# Patient Record
Sex: FEMALE | Race: WHITE | NOT HISPANIC OR LATINO | Employment: UNEMPLOYED | URBAN - METROPOLITAN AREA
[De-identification: names, ages, dates, MRNs, and addresses within clinical notes are randomized per-mention and may not be internally consistent; named-entity substitution may affect disease eponyms.]

---

## 2021-05-29 ENCOUNTER — RECORDS - HEALTHEAST (OUTPATIENT)
Dept: ADMINISTRATIVE | Facility: CLINIC | Age: 51
End: 2021-05-29

## 2024-01-15 RX ORDER — FOLIC ACID 1 MG/1
1 TABLET ORAL DAILY
COMMUNITY
End: 2024-03-04

## 2024-01-15 RX ORDER — ARIPIPRAZOLE 5 MG/1
5 TABLET ORAL DAILY
COMMUNITY

## 2024-01-15 RX ORDER — TRAZODONE HYDROCHLORIDE 100 MG/1
1-3 TABLET ORAL AT BEDTIME
COMMUNITY

## 2024-01-15 RX ORDER — ASPIRIN 81 MG/1
81 TABLET, CHEWABLE ORAL DAILY
COMMUNITY

## 2024-01-15 RX ORDER — LANOLIN ALCOHOL/MO/W.PET/CERES
100 CREAM (GRAM) TOPICAL DAILY
COMMUNITY
End: 2024-03-04

## 2024-01-15 RX ORDER — NALTREXONE HYDROCHLORIDE 50 MG/1
2 TABLET, FILM COATED ORAL DAILY
COMMUNITY

## 2024-01-15 RX ORDER — AMLODIPINE BESYLATE 5 MG/1
5 TABLET ORAL DAILY
COMMUNITY

## 2024-01-15 RX ORDER — ALBUTEROL SULFATE 90 UG/1
2 AEROSOL, METERED RESPIRATORY (INHALATION) EVERY 6 HOURS PRN
COMMUNITY

## 2024-01-15 RX ORDER — HYDROXYZINE PAMOATE 25 MG/1
2 CAPSULE ORAL 3 TIMES DAILY PRN
COMMUNITY

## 2024-01-15 RX ORDER — NICOTINE 21 MG/24HR
1 PATCH, TRANSDERMAL 24 HOURS TRANSDERMAL EVERY 24 HOURS
COMMUNITY
End: 2024-01-16

## 2024-01-15 RX ORDER — DOXEPIN HYDROCHLORIDE 25 MG/1
25 CAPSULE ORAL AT BEDTIME
COMMUNITY

## 2024-01-15 RX ORDER — CITALOPRAM HYDROBROMIDE 40 MG/1
40 TABLET ORAL DAILY
COMMUNITY

## 2024-01-15 RX ORDER — CHOLECALCIFEROL (VITAMIN D3) 50 MCG
1 TABLET ORAL DAILY
COMMUNITY

## 2024-01-15 RX ORDER — GABAPENTIN 300 MG/1
300 CAPSULE ORAL 3 TIMES DAILY
COMMUNITY

## 2024-01-16 ENCOUNTER — TRANSFERRED RECORDS (OUTPATIENT)
Dept: HEALTH INFORMATION MANAGEMENT | Facility: CLINIC | Age: 54
End: 2024-01-16

## 2024-01-16 ENCOUNTER — OFFICE VISIT (OUTPATIENT)
Dept: FAMILY MEDICINE | Facility: CLINIC | Age: 54
End: 2024-01-16
Payer: COMMERCIAL

## 2024-01-16 VITALS
HEIGHT: 63 IN | HEART RATE: 81 BPM | RESPIRATION RATE: 18 BRPM | OXYGEN SATURATION: 98 % | DIASTOLIC BLOOD PRESSURE: 83 MMHG | BODY MASS INDEX: 28.54 KG/M2 | SYSTOLIC BLOOD PRESSURE: 153 MMHG | TEMPERATURE: 98.7 F | WEIGHT: 161.1 LBS

## 2024-01-16 DIAGNOSIS — I10 BENIGN ESSENTIAL HYPERTENSION: ICD-10-CM

## 2024-01-16 DIAGNOSIS — Z86.2 HISTORY OF ANEMIA: ICD-10-CM

## 2024-01-16 DIAGNOSIS — D50.9 IRON DEFICIENCY ANEMIA, UNSPECIFIED IRON DEFICIENCY ANEMIA TYPE: ICD-10-CM

## 2024-01-16 DIAGNOSIS — R53.83 OTHER FATIGUE: Primary | ICD-10-CM

## 2024-01-16 DIAGNOSIS — R63.5 WEIGHT GAIN: ICD-10-CM

## 2024-01-16 DIAGNOSIS — Z87.898 HISTORY OF ALCOHOL USE: ICD-10-CM

## 2024-01-16 DIAGNOSIS — R07.9 CHEST PAIN, UNSPECIFIED TYPE: ICD-10-CM

## 2024-01-16 DIAGNOSIS — R60.0 PERIPHERAL EDEMA: ICD-10-CM

## 2024-01-16 LAB
BASOPHILS # BLD AUTO: 0.1 10E3/UL (ref 0–0.2)
BASOPHILS NFR BLD AUTO: 1 %
EOSINOPHIL # BLD AUTO: 0.2 10E3/UL (ref 0–0.7)
EOSINOPHIL NFR BLD AUTO: 1 %
ERYTHROCYTE [DISTWIDTH] IN BLOOD BY AUTOMATED COUNT: 18.1 % (ref 10–15)
HBA1C MFR BLD: 5.1 %
HCT VFR BLD AUTO: 27.2 % (ref 35–47)
HGB BLD-MCNC: 7.8 G/DL (ref 11.7–15.7)
IMM GRANULOCYTES # BLD: 0.1 10E3/UL
IMM GRANULOCYTES NFR BLD: 1 %
LYMPHOCYTES # BLD AUTO: 1.4 10E3/UL (ref 0.8–5.3)
LYMPHOCYTES NFR BLD AUTO: 11 %
MCH RBC QN AUTO: 20.1 PG (ref 26.5–33)
MCHC RBC AUTO-ENTMCNC: 28.7 G/DL (ref 31.5–36.5)
MCV RBC AUTO: 70 FL (ref 78–100)
MONOCYTES # BLD AUTO: 1.5 10E3/UL (ref 0–1.3)
MONOCYTES NFR BLD AUTO: 12 %
NEUTROPHILS # BLD AUTO: 9.7 10E3/UL (ref 1.6–8.3)
NEUTROPHILS NFR BLD AUTO: 74 %
NRBC # BLD AUTO: 0 10E3/UL
NRBC BLD AUTO-RTO: 0 /100
PLATELET # BLD AUTO: 642 10E3/UL (ref 150–450)
RBC # BLD AUTO: 3.88 10E6/UL (ref 3.8–5.2)
RETICS # AUTO: 0.05 10E6/UL (ref 0.03–0.1)
RETICS/RBC NFR AUTO: 1.2 % (ref 0.5–2)
WBC # BLD AUTO: 13 10E3/UL (ref 4–11)

## 2024-01-16 PROCEDURE — 84443 ASSAY THYROID STIM HORMONE: CPT | Mod: ORL | Performed by: NURSE PRACTITIONER

## 2024-01-16 PROCEDURE — 85045 AUTOMATED RETICULOCYTE COUNT: CPT | Mod: ORL | Performed by: NURSE PRACTITIONER

## 2024-01-16 PROCEDURE — 84466 ASSAY OF TRANSFERRIN: CPT | Mod: ORL | Performed by: NURSE PRACTITIONER

## 2024-01-16 PROCEDURE — 80053 COMPREHEN METABOLIC PANEL: CPT | Mod: ORL | Performed by: NURSE PRACTITIONER

## 2024-01-16 PROCEDURE — 83036 HEMOGLOBIN GLYCOSYLATED A1C: CPT | Mod: ORL | Performed by: NURSE PRACTITIONER

## 2024-01-16 PROCEDURE — 83540 ASSAY OF IRON: CPT | Mod: ORL | Performed by: NURSE PRACTITIONER

## 2024-01-16 PROCEDURE — 82607 VITAMIN B-12: CPT | Mod: ORL | Performed by: NURSE PRACTITIONER

## 2024-01-16 PROCEDURE — 83550 IRON BINDING TEST: CPT | Mod: ORL | Performed by: NURSE PRACTITIONER

## 2024-01-16 PROCEDURE — 85025 COMPLETE CBC W/AUTO DIFF WBC: CPT | Mod: ORL | Performed by: NURSE PRACTITIONER

## 2024-01-16 PROCEDURE — 80061 LIPID PANEL: CPT | Mod: ORL | Performed by: NURSE PRACTITIONER

## 2024-01-16 PROCEDURE — 85060 BLOOD SMEAR INTERPRETATION: CPT | Mod: ORL | Performed by: STUDENT IN AN ORGANIZED HEALTH CARE EDUCATION/TRAINING PROGRAM

## 2024-01-16 PROCEDURE — 82728 ASSAY OF FERRITIN: CPT | Mod: ORL | Performed by: NURSE PRACTITIONER

## 2024-01-16 RX ORDER — HYDROCHLOROTHIAZIDE 12.5 MG/1
12.5 TABLET ORAL DAILY
Qty: 30 TABLET | Refills: 1 | Status: SHIPPED | OUTPATIENT
Start: 2024-01-16 | End: 2024-01-23 | Stop reason: DRUGHIGH

## 2024-01-16 ASSESSMENT — ANXIETY QUESTIONNAIRES
GAD7 TOTAL SCORE: 14
6. BECOMING EASILY ANNOYED OR IRRITABLE: MORE THAN HALF THE DAYS
5. BEING SO RESTLESS THAT IT IS HARD TO SIT STILL: MORE THAN HALF THE DAYS
1. FEELING NERVOUS, ANXIOUS, OR ON EDGE: MORE THAN HALF THE DAYS
GAD7 TOTAL SCORE: 14
IF YOU CHECKED OFF ANY PROBLEMS ON THIS QUESTIONNAIRE, HOW DIFFICULT HAVE THESE PROBLEMS MADE IT FOR YOU TO DO YOUR WORK, TAKE CARE OF THINGS AT HOME, OR GET ALONG WITH OTHER PEOPLE: VERY DIFFICULT
3. WORRYING TOO MUCH ABOUT DIFFERENT THINGS: MORE THAN HALF THE DAYS
7. FEELING AFRAID AS IF SOMETHING AWFUL MIGHT HAPPEN: MORE THAN HALF THE DAYS
2. NOT BEING ABLE TO STOP OR CONTROL WORRYING: MORE THAN HALF THE DAYS

## 2024-01-16 ASSESSMENT — PAIN SCALES - GENERAL: PAINLEVEL: NO PAIN (0)

## 2024-01-16 ASSESSMENT — PATIENT HEALTH QUESTIONNAIRE - PHQ9
5. POOR APPETITE OR OVEREATING: MORE THAN HALF THE DAYS
SUM OF ALL RESPONSES TO PHQ QUESTIONS 1-9: 18

## 2024-01-16 NOTE — NURSING NOTE
"ROOM:3  BERT BLANCHARD    Preferred Name: Kylie     How did you hear about us?  Other - [facility]    54 year old  Chief Complaint   Patient presents with     Anemia     Patient reports that they have low iron. Patient also reports feeling tired (fatigue)        Blood pressure (!) 150/82, pulse 81, temperature 98.7  F (37.1  C), temperature source Oral, resp. rate 18, height 1.61 m (5' 3.4\"), weight 73.1 kg (161 lb 1.6 oz), SpO2 98%. Body mass index is 28.18 kg/m .  BP completed using cuff size:        There is no problem list on file for this patient.      Wt Readings from Last 2 Encounters:   01/16/24 73.1 kg (161 lb 1.6 oz)     BP Readings from Last 3 Encounters:   01/16/24 (!) 150/82       Allergies   Allergen Reactions     Animal Dander Rash     \"Pets\"       Current Outpatient Medications   Medication     albuterol (PROAIR HFA/PROVENTIL HFA/VENTOLIN HFA) 108 (90 Base) MCG/ACT inhaler     amLODIPine (NORVASC) 5 MG tablet     ARIPiprazole (ABILIFY) 5 MG tablet     aspirin (ASA) 81 MG chewable tablet     citalopram (CELEXA) 40 MG tablet     doxepin (SINEQUAN) 25 MG capsule     folic acid (FOLVITE) 1 MG tablet     gabapentin (NEURONTIN) 300 MG capsule     hydrOXYzine eber (VISTARIL) 25 MG capsule     Multiple Vitamins-Minerals (MULTIVITAMIN ADULTS) TABS     naltrexone (DEPADE/REVIA) 50 MG tablet     nicotine (NICODERM CQ) 21 MG/24HR 24 hr patch     nicotine (NICORETTE) 4 MG lozenge     omeprazole (PRILOSEC) 20 MG DR capsule     thiamine (B-1) 100 MG tablet     traZODone (DESYREL) 100 MG tablet     vitamin D3 (CHOLECALCIFEROL) 50 mcg (2000 units) tablet     No current facility-administered medications for this visit.       Social History     Tobacco Use     Smoking status: Former     Types: Cigarettes     Smokeless tobacco: Former   Vaping Use     Vaping Use: Never used   Substance Use Topics     Alcohol use: Not Currently     Drug use: Never       Honoring Choices - Health Care Directive Guide offered to patient " "at time of visit.    Health Maintenance Due   Topic Date Due     ADVANCE CARE PLANNING  Never done     COLORECTAL CANCER SCREENING  Never done     HIV SCREENING  Never done     HEPATITIS C SCREENING  Never done     PAP  Never done     YEARLY PREVENTIVE VISIT  08/09/2007     LIPID  Never done     MAMMO SCREENING  08/08/2017     ZOSTER IMMUNIZATION (1 of 2) Never done     INFLUENZA VACCINE (1) 09/01/2023     COVID-19 Vaccine (4 - 2023-24 season) 09/01/2023     LUNG CANCER SCREENING  12/08/2023       Immunization History   Administered Date(s) Administered     COVID-19 Bivalent 12+ (Pfizer) 10/27/2022       No results found for: \"PAP\"    No lab results found.        1/16/2024     9:22 AM 1/16/2024     9:21 AM   PHQ-2 ( 1999 Pfizer)   Q1: Little interest or pleasure in doing things 1 2   Q2: Feeling down, depressed or hopeless 1 2   PHQ-2 Score 2 4   Q1: Little interest or pleasure in doing things Several days    Q2: Feeling down, depressed or hopeless Several days    PHQ-2 Score 2            1/16/2024     9:21 AM   PHQ-9 SCORE   PHQ-9 Total Score 18           1/16/2024     9:21 AM   BOLA-7 SCORE   Total Score 14            No data to display                Jennifer Beth    January 16, 2024 9:34 AM    "

## 2024-01-16 NOTE — PROGRESS NOTES
[facility] FOLLOW UP VISIT    Patient: Kylie Dietz YOB: 1970    Date of Exam: 1/16/24    Arrival Time: 09 45 AM  Departure Time: 10 43 AM    Charge Phone (written on paperwork):  [phone]    Please be advised that this client resides in a facility in which narcotic medications are not permitted. If pain management is needed, please prescribe an alternative medication.     Kylie Dietz is a 54 year old who presents for the following    Patient presents with:  Anemia: Patient reports that they have low iron. Patient also reports feeling tired (fatigue)     54 year-old female with history ETOH, depression, HTN x 10y, Raynauds, JOANNE presents to clinic for evaluation of recent onset peripheral edema, weight gain, SOB and fatigue. Admitted to [facility] 3 weeks ago for ETOH. Was drinking 8-10 beers per day prior to admisson.  Has 35 pack year + history smoking - was up to almost 2 packs per day prior to admission. Quit about 2 months ago. Using lozenges currently. Since admission has gained 28 pounds. Is eating more, but does not account for weight gain. She is also feeling fatigued. History JOANNE and has received IV iron in the past.  A long time since last hemoglobin check. She notes some dyspnea with activity.  Thinks she experienced chest pain last night but thought it might have been a dream. Denies history of peripheral edema, SOB or chest pain but indicates was told in the past she should have an EKG. No baseline to review. Has been on amlodipine for several years and has not caused edema before.   [facility] significant for sister with edema and  pre DM and father CVD in 70s.  Is feeling depressed today. Reports somewhat due to acclimation to new setting. Seeing Mental health regularly at [facility]. Was feeling suicidal before admission but denies now and states has no plan of self-harm or harming others.    Had lower right molar removed yesterday and some swelling over right cheek  "can jaw.  No prolonged bleeding. Took acetaminophen this morning.   Do you need any refills on your Medications today? No    Review Of Systems  Review Of Systems  GEN: weight gain 28 pounds.Feels\"full\"  HEENT: mild congestion. Had COVID 3 weeks ago and some lingering congestion   RESP: occasional loose sounding cough, Some SOB with climbing stairs, OK at rest.   CV: HTN for 10 y. Possible episode of chest pain last night but she indicates this might have been a dream. Currently no CP. No irregular heart beats. Peripheral edema both ankles x 3 weeks, R.L. A little bit of discomfort along lateral right ankle  ABD: no liver issues in the past. Has FRANCOIS and taking omeprazole which has helped. No stool pattern changes. Last colonoscopy 6 y ago  SKIN: negative for bruising, bleeding  NEURO: no dizziness, no HA, no numbness or tingling.       General Physical Exam:  Vitals: BP (!) 153/83 (BP Location: Left arm, Patient Position: Dangled, Cuff Size: Adult Regular)   Pulse 81   Temp 98.7  F (37.1  C) (Oral)   Resp 18   Ht 1.61 m (5' 3.4\")   Wt 73.1 kg (161 lb 1.6 oz)   SpO2 98%   BMI 28.18 kg/m    GEN: alert talkative female in NAD  HEENT: Eyes clear, NOAH.  Ears: small to moderate cerumen right external canal, TM is gray, dull. LTM gray dull. Nose: edema turbinates, R.L, no erythema or discharge. OP: erythema mild right back molar area, minimal swelling but mild swelling noted over right cheek and jaw.  RESP: lungs clear to auscultation. CV: HRRR, S1, s2, no MRG. Unlabored. Occasional loose sounding cough.   CV: HRRR, S1, S2, no MRG. 12 lead EKG: NSR  ABD: Soft rounded with BSx4, no HSM; nontender  SKIN: no bruising petechaie. Slight swelling right cheek and jaw, no erythema. Tender.       Assessment / Plan:  1. Other fatigue  Heme labs. Reviewed high iron foods.   - CBC with platelets; Future  - Ferritin; Future  - IRON AND IRON BINDING CAPACITY; Future  - Ferritin  - IRON AND IRON BINDING CAPACITY    2. Weight " gain  Reviewed healthy diet: low fat, low salt. More vegetables.  Will check metabolic today to rule out other etiologies. FMH preDM, Needs better control of BP.  WIll check Thyroid also. Not fasting today but will check lipids.   - TSH with free T4 reflex; Future  - Lipid panel reflex to direct LDL Fasting; Future  - Hemoglobin A1c; Future  - TSH with free T4 reflex  - Lipid panel reflex to direct LDL Fasting  - Hemoglobin A1c    3. Benign essential hypertension  Add hydrochlorothiazide low dose given peripheral edema, uncontrolled BP.  Goal /80.  Return next week for labs, BP recheck and K+. Reviewed high potassium foods  - CBC with platelets; Future  - hydrochlorothiazide (HYDRODIURIL) 12.5 MG tablet; Take 1 tablet (12.5 mg) by mouth daily  Dispense: 30 tablet; Refill: 1    4. History of alcohol use  Check liver  - Comprehensive metabolic panel; Future  - Comprehensive metabolic panel    5. Chest pain, unspecified type  Uncertain history but will obtain stress test. EKG was normal, but numerous risks: Smoking history 35 pack year: 1-2 packs per day. Stopped currently, overweight, uncontrolled BP, history JERROD  - EKG 12-lead complete w/read - Clinics  - Exercise Stress Test - Adult; Future    6. History of anemia  Anemia panel.  Reviewed high iron foods. On folate, so less likelihood B12 deficiency. Hx JOANNE  - IRON AND IRON BINDING CAPACITY; Future  - Transferrin; Future  - Lab Blood Morphology Pathologist Review; Future  - VITAMIN B12; Future  - IRON AND IRON BINDING CAPACITY  - Transferrin  - Lab Blood Morphology Pathologist Review  - VITAMIN B12    7. Peripheral edema  Elevate legs at rest  - Compression Sleeve/Stocking Order for DME - ONLY FOR DME      Referrals Made:   Referral to stress test  If a referral was made to a [facility] and you don't get a call from central scheduling please call [phone].  If a Mammogram was ordered for you at [facility] call [phone] to  schedule or change your appointment.  If you had an XRay/CT/Ultrasound/MRI ordered the number is [phone] to schedule or change your radiology appointment.     Please make an in person follow up visit with ME, [unfilled], in 1 week to discuss BP, review labs, recheck K+    Medication changes made at today's visit: MEDICATIONS:   Orders Placed This Encounter   Medications    amLODIPine (NORVASC) 5 MG tablet     Sig: Take 5 mg by mouth daily Take 1 and 1/2 tablets by mouth daily    ARIPiprazole (ABILIFY) 5 MG tablet     Sig: Take 5 mg by mouth daily    citalopram (CELEXA) 40 MG tablet     Sig: Take 40 mg by mouth daily    doxepin (SINEQUAN) 25 MG capsule     Sig: Take 25 mg by mouth at bedtime    folic acid (FOLVITE) 1 MG tablet     Sig: Take 1 mg by mouth daily    gabapentin (NEURONTIN) 300 MG capsule     Sig: Take 300 mg by mouth 3 times daily    Multiple Vitamins-Minerals (MULTIVITAMIN ADULTS) TABS    naltrexone (DEPADE/REVIA) 50 MG tablet     Sig: Take 50 mg by mouth daily    DISCONTD: nicotine (NICODERM CQ) 21 MG/24HR 24 hr patch     Sig: Place 1 patch onto the skin every 24 hours    omeprazole (PRILOSEC) 20 MG DR capsule     Sig: Take 20 mg by mouth daily    thiamine (B-1) 100 MG tablet     Sig: Take 100 mg by mouth daily    vitamin D3 (CHOLECALCIFEROL) 50 mcg (2000 units) tablet     Sig: Take 1 tablet by mouth daily    hydrOXYzine eber (VISTARIL) 25 MG capsule     Sig: Take 25 mg by mouth 3 times daily as needed    aspirin (ASA) 81 MG chewable tablet     Sig: Take 81 mg by mouth daily    nicotine (NICORETTE) 4 MG lozenge     Sig: Place 4 mg inside cheek every hour as needed    traZODone (DESYREL) 100 MG tablet     Sig: Take 100 mg by mouth at bedtime    albuterol (PROAIR HFA/PROVENTIL HFA/VENTOLIN HFA) 108 (90 Base) MCG/ACT inhaler     Sig: Inhale 2 puffs into the lungs every 6 hours as needed    hydrochlorothiazide (HYDRODIURIL) 12.5 MG tablet     Sig: Take 1 tablet (12.5 mg) by mouth daily      Dispense:  30 tablet     Refill:  1     Medications Discontinued During This Encounter   Medication Reason    nicotine (NICODERM CQ) 21 MG/24HR 24 hr patch Stopped by Patient (No AVS)          - Continue other medications without change    AGATHA Bass CNP   Hemoglobin came back at 7.8, partially dilutional given excess fluid but iron studies low. Will start on oral iron, check reticulocyte count in 1 week and follow up 2-4 weeks. Has needed IV iron infusion in the past so might need that again. Will need to follow up this week re: WBC also elevated. Needs further exploration of that. Plan UA at minimum.  Iron therapy with mixed recommendations currently re: multiple daily doses, daily dosing or every other day dosing. Begin daily iron and monitor carefully. If thiazide diuretic pulls off some fluid, may see slight increase in hemoglobin.   Note from cardiology: could not do stress test given low Hbg. When stress test was ordered, did not have hemoglobin result. Options are to wait, do Lexiscan Nuclear Medicine Stress test or a Cardiac CT. Will discuss with patient at follow up. Elsa SNIDER CNP  Status: Final result       Visible to patient: No (inaccessible in MyChart)       Dx: History of anemia    1 Result Note      Component  Ref Range & Units 1 d ago   WBC Count  4.0 - 11.0 10e3/uL 13.0 High    RBC Count  3.80 - 5.20 10e6/uL 3.88   Hemoglobin  11.7 - 15.7 g/dL 7.8 Low    Hematocrit  35.0 - 47.0 % 27.2 Low    MCV  78 - 100 fL 70 Low    MCH  26.5 - 33.0 pg 20.1 Low    MCHC  31.5 - 36.5 g/dL 28.7 Low    RDW  10.0 - 15.0 % 18.1 High    Platelet Count  150 - 450 10e3/uL 642 High    % Neutrophils  % 74   % Lymphocytes  % 11   % Monocytes  % 12   % Eosinophils  % 1   % Basophils  % 1   % Immature Granulocytes  % 1   NRBCs per 100 WBC  <1 /100 0   Absolute Neutrophils  1.6 - 8.3 10e3/uL 9.7 High    Absolute Lymphocytes  0.8 - 5.3 10e3/uL 1.4   Absolute Monocytes  0.0 - 1.3 10e3/uL 1.5 High     Absolute Eosinophils  0.0 - 0.7 10e3/uL 0.2   Absolute Basophils  0.0 - 0.2 10e3/uL 0.1   Absolute Immature Granulocytes  <=0.4 10e3/uL 0.1   Absolute NRBCs  10e3/uL 0.0   Resulting Agency [facility]                   0 Result Notes      Component  Ref Range & Units 1 d ago   Iron  37 - 145 ug/dL 13 Low    Iron Binding Capacity  240 - 430 ug/dL 402   Iron Sat Index  15 - 46 % 3 Low    Resulting Agency [facility]

## 2024-01-16 NOTE — LETTER
January 23, 2024      Kylie Dietz  [address] [address]        Dear ,    We are writing to inform you of your test results.    Please make an appointment with your provider to review or follow up on your test results.  Appointments can be made by calling [phone]. Here are the lab results we discussed at your visit. Please schedule follow up in 2 weeks as we discussed. Thank you.    Resulted Orders   Ferritin   Result Value Ref Range    Ferritin 10 (L) 11 - 328 ng/mL   Comprehensive metabolic panel   Result Value Ref Range    Sodium 137 135 - 145 mmol/L      Comment:      Reference intervals for this test were updated on 09/26/2023 to more accurately reflect our healthy population. There may be differences in the flagging of prior results with similar values performed with this method. Interpretation of those prior results can be made in the context of the updated reference intervals.     Potassium 4.3 3.4 - 5.3 mmol/L    Carbon Dioxide (CO2) 26 22 - 29 mmol/L    Anion Gap 10 7 - 15 mmol/L    Urea Nitrogen 11.3 6.0 - 20.0 mg/dL    Creatinine 0.55 0.51 - 0.95 mg/dL    GFR Estimate >90 >60 mL/min/1.73m2    Calcium 9.3 8.6 - 10.0 mg/dL    Chloride 101 98 - 107 mmol/L    Glucose 71 70 - 99 mg/dL    Alkaline Phosphatase 55 40 - 150 U/L      Comment:      Reference intervals for this test were updated on 11/14/2023 to more accurately reflect our healthy population. There may be differences in the flagging of prior results with similar values performed with this method. Interpretation of those prior results can be made in the context of the updated reference intervals.    AST 27 0 - 45 U/L      Comment:      Reference intervals for this test were updated on 6/12/2023 to more accurately reflect our healthy population. There may be differences in the flagging of prior results with similar values performed with this method. Interpretation of those prior results can be made in the  context of the updated reference intervals.    ALT 20 0 - 50 U/L      Comment:      Reference intervals for this test were updated on 6/12/2023 to more accurately reflect our healthy population. There may be differences in the flagging of prior results with similar values performed with this method. Interpretation of those prior results can be made in the context of the updated reference intervals.      Protein Total 7.0 6.4 - 8.3 g/dL    Albumin 4.3 3.5 - 5.2 g/dL    Bilirubin Total 0.3 <=1.2 mg/dL   TSH with free T4 reflex   Result Value Ref Range    TSH 2.03 0.30 - 4.20 uIU/mL   IRON AND IRON BINDING CAPACITY   Result Value Ref Range    Iron 13 (L) 37 - 145 ug/dL    Iron Binding Capacity 402 240 - 430 ug/dL    Iron Sat Index 3 (L) 15 - 46 %   Transferrin   Result Value Ref Range    Transferrin 340.0 200.0 - 360.0 mg/dL   VITAMIN B12   Result Value Ref Range    Vitamin B12 520 232 - 1,245 pg/mL   Lipid panel reflex to direct LDL Fasting   Result Value Ref Range    Cholesterol 167 <200 mg/dL    Triglycerides 110 <150 mg/dL    Direct Measure HDL 49 (L) >=50 mg/dL    LDL Cholesterol Calculated 96 <=100 mg/dL    Non HDL Cholesterol 118 <130 mg/dL    Patient Fasting > 8hrs? Unknown     Narrative    Cholesterol  Desirable:  <200 mg/dL    Triglycerides  Normal:  Less than 150 mg/dL  Borderline High:  150-199 mg/dL  High:  200-499 mg/dL  Very High:  Greater than or equal to 500 mg/dL    Direct Measure HDL  Female:  Greater than or equal to 50 mg/dL   Male:  Greater than or equal to 40 mg/dL    LDL Cholesterol  Desirable:  <100mg/dL  Above Desirable:  100-129 mg/dL   Borderline High:  130-159 mg/dL   High:  160-189 mg/dL   Very High:  >= 190 mg/dL    Non HDL Cholesterol  Desirable:  130 mg/dL  Above Desirable:  130-159 mg/dL  Borderline High:  160-189 mg/dL  High:  190-219 mg/dL  Very High:  Greater than or equal to 220 mg/dL   Hemoglobin A1c   Result Value Ref Range    Hemoglobin A1C 5.1 <5.7 %      Comment:      Normal  <5.7%   Prediabetes 5.7-6.4%    Diabetes 6.5% or higher     Note: Adopted from ADA consensus guidelines.   Bld morphology pathology review   Result Value Ref Range    Final Diagnosis       Peripheral Blood Smear:  - Moderate overall hypochromic, microcytic anemia; no increase in erythrocyte regeneration  - Slight leukocytosis; neutrophilia; monocytosis  - Slight thrombocytosis  - See comment        Comment       Neutrophilia and monocytosis can be seen together in acute and chronic infectious and inflammatory processes as well as with both hematologic and non-hematologic malignancies. Notably, they have also been described together as benign, nonspecific findings in asplenic patients. A repeat CBC with differential is recommended when this patient is clinically well to assess for the resolution of these findings.      Clinical Information       From Epic electronic medical record; 54-year-old female with a history of EtOH, depression, Raynaud's and iron deficiency anemia presented for evaluation of recent onset peripheral edema, weight gain, shortness of breath and fatigue. Peripheral smear review requested for history of severe JOANNE, EtOH, fatigue.      Peripheral Smear       The red blood cells appear hypochromic with occasional normochromic red blood cells.  Poikilocytosis includes rare dacryocytes and rare elliptocytes. Polychromasia is not increased.  Rouleaux formation is not increased. The morphology of the platelets is normal.        Peripheral Hematologic Data       CBC WITH DIFFERENTIAL(01/16/2024 01:20 PM CST):     RESULT VALUE REF. RANGE UNITS    WBC Count    Hemoglobin    Hematocrit    Platelet Count   RBC Count   MCV    MCH    MCHC    RDW   13.0  ( H )     7.8  ( L )      27.2  ( L )   642  ( H )  3.88 (NORMAL)        70  ( L )      20.1  ( L )      28.7  ( L )      18.1  ( H ) 4.0-11.0  11.7-15.7  35.0-47.0  150-450  3.80-5.20    26.5-33.0  31.5-36.5  10.0-15.0  10e3/uL  g/dL  %  10e3/uL  10e6/uL  fL  pg  g/dL  %   % Neutrophils  % Lymphocytes  % Monocytes  % Eosinophils  % Basophils  % Immature Granulocytes   Absolute Neutrophils   Absolute Lymphocytes   Absolute Monocytes   Absolute Eosinophils  Absolute Basophils  Absolute Immature Granulocytes  NRBCs per 100 WBC  Absolute NRBCs 74  11  12  1  1  1   9.7  ( H )  1.4 (NORMAL)   1.5  ( H )  0.2 (NORMAL)  0.1 (NORMAL)  0.1 (NORMAL)  0 (NORMAL)  0.0 () N/A  N/A  N/A  N/A  N/A  N/A  1.6-8.3  0.8-5.3  0.0-1.3  0.0-0.7  0.0-0.2  <=0.4  <1  <=0.0 %  %  %  %  %  %  10e3/uL  10e3/uL  10e3/uL  10e3/uL  10e3/uL  10e3/uL  /100  10e3/uL   RETICULOCYTE COUNT (01/16/2024 01:09 PM CST):  RESULT VALUE REF. RANGE UNITS   % Reticulocyte  Absolute Reticulocyte 1.2 (NORMAL)  0.047 (NORMAL) 0.5-2.0  0.025-0.095 %  10e6/uL         Performing Labs       The technical component of this testing was completed at [facility] and [facility]     CBC with platelets and differential   Result Value Ref Range    WBC Count 13.0 (H) 4.0 - 11.0 10e3/uL    RBC Count 3.88 3.80 - 5.20 10e6/uL    Hemoglobin 7.8 (L) 11.7 - 15.7 g/dL    Hematocrit 27.2 (L) 35.0 - 47.0 %    MCV 70 (L) 78 - 100 fL    MCH 20.1 (L) 26.5 - 33.0 pg    MCHC 28.7 (L) 31.5 - 36.5 g/dL    RDW 18.1 (H) 10.0 - 15.0 %    Platelet Count 642 (H) 150 - 450 10e3/uL    % Neutrophils 74 %    % Lymphocytes 11 %    % Monocytes 12 %    % Eosinophils 1 %    % Basophils 1 %    % Immature Granulocytes 1 %    NRBCs per 100 WBC 0 <1 /100    Absolute Neutrophils 9.7 (H) 1.6 - 8.3 10e3/uL    Absolute Lymphocytes 1.4 0.8 - 5.3 10e3/uL    Absolute Monocytes 1.5 (H) 0.0 - 1.3 10e3/uL    Absolute Eosinophils 0.2 0.0 - 0.7 10e3/uL    Absolute Basophils 0.1 0.0 - 0.2 10e3/uL    Absolute Immature Granulocytes 0.1 <=0.4 10e3/uL    Absolute NRBCs 0.0 10e3/uL   Reticulocyte count   Result Value Ref Range    % Reticulocyte 1.2 0.5 - 2.0 %    Absolute Reticulocyte  0.047 0.025 - 0.095 10e6/uL   Reticulocyte count   Result Value Ref Range    % Reticulocyte 1.6 0.5 - 2.0 %    Absolute Reticulocyte 0.063 0.025 - 0.095 10e6/uL       If you have any questions or concerns, please call the clinic at the number listed above.       Sincerely,      AGATHA Bass CNP

## 2024-01-16 NOTE — PATIENT INSTRUCTIONS
Weight gain  TSH with reflex T4  Metabolic panel  A1c  Lipid panel  Healthy diet: low fat, low salt, small portions: increase vegetables. Avoid fried foods, fast foods, sweets, snack foods, salty foods.     Fatigue with history of iron deficiency anemia  CBC today, anemia panel  High iron foods: lean meats, dried fruits, iron fortified breads and cereals    High blood pressure with peripheral edema possible chest pain; weight gain  Add hydrochlorothiazide 12.5mg daily: diuretic will draw fluid weight out. May make you go to the bathroom more.   Eat foods high in potassium: Bananas, orange juice, potatoes, greens  Follow up 1 week - review labs and recheck potassium at that time  EKG today: normal sinus rhythm  Stress test ordered  Seek care if recurrence of chest pain. Continue daily aspirin  Elevate legs at rest. Wear compression stockings when up. May take off at night.     Depression  Follow with mental health at [facility]. Seek care if worsening symptoms

## 2024-01-16 NOTE — PROGRESS NOTES
Depression Response    Patient completed the PHQ-9 assessment for depression and scored >9? Yes  Question 9 on the PHQ-9 was positive for suicidality? Yes  Does patient have current mental health provider? Yes    Is this a virtual visit? No    I personally notified the following: clinic nurse

## 2024-01-17 LAB
ALBUMIN SERPL BCG-MCNC: 4.3 G/DL (ref 3.5–5.2)
ALP SERPL-CCNC: 55 U/L (ref 40–150)
ALT SERPL W P-5'-P-CCNC: 20 U/L (ref 0–50)
ANION GAP SERPL CALCULATED.3IONS-SCNC: 10 MMOL/L (ref 7–15)
AST SERPL W P-5'-P-CCNC: 27 U/L (ref 0–45)
BILIRUB SERPL-MCNC: 0.3 MG/DL
BUN SERPL-MCNC: 11.3 MG/DL (ref 6–20)
CALCIUM SERPL-MCNC: 9.3 MG/DL (ref 8.6–10)
CHLORIDE SERPL-SCNC: 101 MMOL/L (ref 98–107)
CHOLEST SERPL-MCNC: 167 MG/DL
CREAT SERPL-MCNC: 0.55 MG/DL (ref 0.51–0.95)
DEPRECATED HCO3 PLAS-SCNC: 26 MMOL/L (ref 22–29)
EGFRCR SERPLBLD CKD-EPI 2021: >90 ML/MIN/1.73M2
FASTING STATUS PATIENT QL REPORTED: ABNORMAL
FERRITIN SERPL-MCNC: 10 NG/ML (ref 11–328)
GLUCOSE SERPL-MCNC: 71 MG/DL (ref 70–99)
HDLC SERPL-MCNC: 49 MG/DL
IRON BINDING CAPACITY (ROCHE): 402 UG/DL (ref 240–430)
IRON SATN MFR SERPL: 3 % (ref 15–46)
IRON SERPL-MCNC: 13 UG/DL (ref 37–145)
LDLC SERPL CALC-MCNC: 96 MG/DL
NONHDLC SERPL-MCNC: 118 MG/DL
POTASSIUM SERPL-SCNC: 4.3 MMOL/L (ref 3.4–5.3)
PROT SERPL-MCNC: 7 G/DL (ref 6.4–8.3)
RETICS # AUTO: 0.06 10E6/UL (ref 0.03–0.1)
RETICS/RBC NFR AUTO: 1.6 % (ref 0.5–2)
SODIUM SERPL-SCNC: 137 MMOL/L (ref 135–145)
TRANSFERRIN SERPL-MCNC: 340 MG/DL (ref 200–360)
TRIGL SERPL-MCNC: 110 MG/DL
TSH SERPL DL<=0.005 MIU/L-ACNC: 2.03 UIU/ML (ref 0.3–4.2)
VIT B12 SERPL-MCNC: 520 PG/ML (ref 232–1245)

## 2024-01-17 RX ORDER — FERROUS SULFATE 325(65) MG
325 TABLET ORAL
Qty: 90 TABLET | Refills: 1 | Status: SHIPPED | OUTPATIENT
Start: 2024-01-17

## 2024-01-18 ENCOUNTER — TELEPHONE (OUTPATIENT)
Dept: FAMILY MEDICINE | Facility: CLINIC | Age: 54
End: 2024-01-18

## 2024-01-18 NOTE — TELEPHONE ENCOUNTER
"Philly from [facility] sent provider a message:    \"Kylie Dietz went for her stress test today, they said they weren't able to complete due to her blood. Client said they were \"faxing stuff over\" but didn't specify to who. I just wanted her provider to be aware. Thanks!\"  "

## 2024-01-19 NOTE — PROGRESS NOTES
[facility] OR FOLLOW UP VISIT    Patient: Kylie Dietz YOB: 1970    Date of Exam: 1/23/24    Arrival Time: 09 10 AM  Departure Time: 11 18 PM    Charge Phone (written on paperwork):  [phone]    Please be advised that this client resides in a facility in which narcotic medications are not permitted. If pain management is needed, please prescribe an alternative medication.     Kylie Dietz is a 54 year old who presents for the following    Patient presents with:  Results    54 year-old female presents for lab results and follow up of anemia and BP.  Started on hydrochlorothiazide 12.5mg last week for elevated BP, weight gain and apparent fluid retention (peripheral edema).  Tolerating well. Has not lost a lot of weight or felt that was urinating more. Eating high potassium foods.   Compression stockings were ordered, but has not received.   Anemia: hgb 7.8 on check; likely partially dilutional given edema and recent weight gain. Has been anemic in the past and once required IV iron infusion.  Taking 1 tab iron per day currently. Tends to constipation when on iron and has noted that. Interested in something for constipation. .   Do you need any refills on your Medications today? No    Review Of Systems  Review Of Systems  GEN: negative fever, chills. Lost 1 pound since last week. Some fatigue. Sleeping OK.   HEENT: has a dry spot in left ear/external to canal which is slightly itchy and sore. Has lesion in left nare that bleeds sometimes. Air at [facility] very dry and she did get a humidifier although she does not think it has helped much. Feels some head congestion but no runny nose, no sore throat  CV: Negative for chest pain. Did report this last week and a stress test was ordered, but canceled due to low hemoglobin. Will hold for now.   RESP: negative SOB, cough  GI: constipation with start of iron therapy  EXTREMITIES: no change in edema. Has not been elevating feet  "much. Did not get stockings. Does not note change from last week.   HEME: no bruising, bleeding.    MOOD: adjusting to [facility]. In therapy, reports this is going well.       General Physical Exam:  Vitals:   /75 (BP Location: Left arm, Patient Position: Sitting, Cuff Size: Adult Regular)   Pulse 75   Temp 98.8  F (37.1  C) (Oral)   Resp 16   Ht 1.615 m (5' 3.6\")   Wt 72.8 kg (160 lb 8 oz)   SpO2 98%   BMI 27.90 kg/m      GEN: alert female in NAD. Upbeat, more animated than previous encounter  HEENT: Eyes clear. Ears: external ear lesion left ear as below. RTM: impacted cerumen, partial view of right TM: Gray. Cerumen removed through ear. RTM intact, gray with LR. Mild erythema right ear canal post irrigation. LTM gray with LR. No cerumen.  Left nare: erythematous irritated open area left medial nare. Right intact. OP clear.   SKIN: small 2mm erythematous dry scabbed area at left external otic canal. ; no bruising or echymosis   EXTREMITIES: 1+ edema to mid tibia bilaterally, taut at ankles. Pedal pulses +2 bilaterally.  Minimal erythema. Usual warmth. No lesions.        Assessment / Plan:  1. Peripheral edema  Reordered compression stockings as do not see previously ordered stockings in chart and patient has not received. Elevate legs at bed. Plan increase in hydrochlorothiazide dose. Continue intake of High K+ foods and given list. Goal /80. Some improvement from previous.    - Compression Sleeve/Stocking Order for DME - ONLY FOR DME  - hydrochlorothiazide (HYDRODIURIL) 25 MG tablet; Take 1 tablet (25 mg) by mouth daily  Dispense: 30 tablet; Refill: 1    2. Iron deficiency anemia, unspecified iron deficiency anemia type  Recheck retic count to see increase with iron therapy. Plan recheck of CBC with differential in 2 weeks.   - Reticulocyte count; Future  - Reticulocyte count    3. Medication management  Monitor K+ level in hydrochlorothiazide therapy  - Potassium; Future  - Potassium    4. " Constipation, unspecified constipation type  Constipation secondary to iron therapy. Begin Miralax daily prn. Discussed daily use until soft easy pass daily stool then taper to as needed to maintain. Increase fiber in diet. Drink water  - polyethylene glycol (MIRALAX) 17 GM/Dose powder; Take 17 g (1 Capful) by mouth daily as needed for constipation  Dispense: 578 g; Refill: 1    5. Benign essential hypertension  As above, will increase hydrochlorothiazide, some response to it.  May benefit from higher dose to further mobilize fluid.   - hydrochlorothiazide (HYDRODIURIL) 25 MG tablet; Take 1 tablet (25 mg) by mouth daily  Dispense: 30 tablet; Refill: 1    6. Skin lesion  Apply bacitracin to external left ear and left nare 2-3 times per day for 7-10 days.  Continue humidification. Seek care if not improving.   - bacitracin 500 UNIT/GM ophthalmic ointment; Apply to right nare and left ear canal opening 2-3 times per day for 7-10 days for irritation  Dispense: 3.5 g; Refill: 0    7. Impacted cerumen of right ear  Cerumen removed through irrigation by MA. TM is gray. Slight erythema canal following procedure.   - REMOVE IMPACTED CERUMEN      Referrals Made:   NO REFERRALS MADE TODAY  If a referral was made to a [facility] and you don't get a call from central scheduling please call [phone].  If a Mammogram was ordered for you at [facility] call [phone] to schedule or change your appointment.  If you had an XRay/CT/Ultrasound/MRI ordered the number is [phone] to schedule or change your radiology appointment.     Please make an in person follow up visit with ME, [unfilled], in 2 weeks to discuss Anemia and repeat CBC with differential    Medication changes made at today's visit: MEDICATIONS:   Orders Placed This Encounter   Medications    ibuprofen (ADVIL/MOTRIN) 600 MG tablet     Sig: Take 600 mg by mouth 3 times daily as needed for moderate pain    nicotine (NICODERM  CQ) 21 MG/24HR 24 hr patch     Sig: Place 1 patch onto the skin every 24 hours    polyethylene glycol (MIRALAX) 17 GM/Dose powder     Sig: Take 17 g (1 Capful) by mouth daily as needed for constipation     Dispense:  578 g     Refill:  1    hydrochlorothiazide (HYDRODIURIL) 25 MG tablet     Sig: Take 1 tablet (25 mg) by mouth daily     Dispense:  30 tablet     Refill:  1    bacitracin 500 UNIT/GM ophthalmic ointment     Sig: Apply to right nare and left ear canal opening 2-3 times per day for 7-10 days for irritation     Dispense:  3.5 g     Refill:  0     Medications Discontinued During This Encounter   Medication Reason    hydrochlorothiazide (HYDRODIURIL) 12.5 MG tablet Dose adjustment          - Continue other medications without change    AGATHA Bass CNP

## 2024-01-22 LAB
PATH REPORT.COMMENTS IMP SPEC: NORMAL
PATH REPORT.COMMENTS IMP SPEC: NORMAL
PATH REPORT.FINAL DX SPEC: NORMAL
PATH REPORT.MICROSCOPIC SPEC OTHER STN: NORMAL
PATH REPORT.MICROSCOPIC SPEC OTHER STN: NORMAL
PATH REPORT.RELEVANT HX SPEC: NORMAL

## 2024-01-23 ENCOUNTER — TELEPHONE (OUTPATIENT)
Dept: FAMILY MEDICINE | Facility: CLINIC | Age: 54
End: 2024-01-23

## 2024-01-23 ENCOUNTER — OFFICE VISIT (OUTPATIENT)
Dept: FAMILY MEDICINE | Facility: CLINIC | Age: 54
End: 2024-01-23
Payer: COMMERCIAL

## 2024-01-23 VITALS
WEIGHT: 160.5 LBS | TEMPERATURE: 98.8 F | SYSTOLIC BLOOD PRESSURE: 136 MMHG | DIASTOLIC BLOOD PRESSURE: 75 MMHG | HEART RATE: 75 BPM | HEIGHT: 64 IN | RESPIRATION RATE: 16 BRPM | BODY MASS INDEX: 27.4 KG/M2 | OXYGEN SATURATION: 98 %

## 2024-01-23 DIAGNOSIS — I10 BENIGN ESSENTIAL HYPERTENSION: ICD-10-CM

## 2024-01-23 DIAGNOSIS — K59.00 CONSTIPATION, UNSPECIFIED CONSTIPATION TYPE: ICD-10-CM

## 2024-01-23 DIAGNOSIS — L98.9 SKIN LESION: ICD-10-CM

## 2024-01-23 DIAGNOSIS — Z79.899 MEDICATION MANAGEMENT: ICD-10-CM

## 2024-01-23 DIAGNOSIS — R60.0 PERIPHERAL EDEMA: Primary | ICD-10-CM

## 2024-01-23 DIAGNOSIS — H61.21 IMPACTED CERUMEN OF RIGHT EAR: ICD-10-CM

## 2024-01-23 DIAGNOSIS — D50.9 IRON DEFICIENCY ANEMIA, UNSPECIFIED IRON DEFICIENCY ANEMIA TYPE: ICD-10-CM

## 2024-01-23 LAB
RETICS # AUTO: 0.08 10E6/UL (ref 0.03–0.1)
RETICS/RBC NFR AUTO: 2 % (ref 0.5–2)

## 2024-01-23 PROCEDURE — 84132 ASSAY OF SERUM POTASSIUM: CPT | Mod: ORL | Performed by: NURSE PRACTITIONER

## 2024-01-23 PROCEDURE — 85045 AUTOMATED RETICULOCYTE COUNT: CPT | Mod: ORL | Performed by: NURSE PRACTITIONER

## 2024-01-23 RX ORDER — BACITRACIN 500 [USP'U]/G
OINTMENT OPHTHALMIC
Qty: 3.5 G | Refills: 0 | Status: SHIPPED | OUTPATIENT
Start: 2024-01-23 | End: 2024-03-04

## 2024-01-23 RX ORDER — NICOTINE 21 MG/24HR
1 PATCH, TRANSDERMAL 24 HOURS TRANSDERMAL EVERY 24 HOURS
COMMUNITY
End: 2024-02-06

## 2024-01-23 RX ORDER — POLYETHYLENE GLYCOL 3350 17 G/17G
1 POWDER, FOR SOLUTION ORAL DAILY PRN
Qty: 578 G | Refills: 1 | Status: SHIPPED | OUTPATIENT
Start: 2024-01-23

## 2024-01-23 RX ORDER — IBUPROFEN 600 MG/1
600 TABLET, FILM COATED ORAL 3 TIMES DAILY PRN
COMMUNITY

## 2024-01-23 RX ORDER — HYDROCHLOROTHIAZIDE 25 MG/1
25 TABLET ORAL DAILY
Qty: 30 TABLET | Refills: 1 | Status: SHIPPED | OUTPATIENT
Start: 2024-01-23

## 2024-01-23 ASSESSMENT — PAIN SCALES - GENERAL: PAINLEVEL: NO PAIN (0)

## 2024-01-23 NOTE — LETTER
January 24, 2024      Kylie MILDRED OgdenJ Luis  [address] [address]        Dear ,    We are writing to inform you of your test results.    Your test results fall within the expected range(s) or remain unchanged from previous results.  Please continue with current treatment plan.    Resulted Orders   Potassium   Result Value Ref Range    Potassium 4.0 3.4 - 5.3 mmol/L   Reticulocyte count   Result Value Ref Range    % Reticulocyte 2.0 0.5 - 2.0 %    Absolute Reticulocyte 0.076 0.025 - 0.095 10e6/uL     Your potassium is stable. You are doing a good job of getting potassium sources in  your food. Your reticulocyte count also came up so you are likely responding to the iron. We will recheck that in 3 -4 weeks. Thank you  If you have any questions or concerns, please call the clinic at the number listed above.       Sincerely,      AGATHA Bass CNP

## 2024-01-23 NOTE — TELEPHONE ENCOUNTER
Prior Authorization Retail Medication Request    Medication/Dose: Bacitracin 500UNIT/GM Ointment  Diagnosis and ICD code (if different than what is on RX):    New/renewal/insurance change PA/secondary ins. PA:  Previously Tried and Failed:  See chart  Rationale:  See chart    Insurance   Primary: [employer]  Insurance ID:  [id number]    Secondary (if applicable):  Insurance ID:      Pharmacy Information (if different than what is on RX)  Name:  [facility], [place], operated by [facility] on [address]  Phone:  [phone]  Fax:[phone]

## 2024-01-23 NOTE — PATIENT INSTRUCTIONS
Anemia  Recheck reticulocyte count  Recheck cbc with differential in 2 weeks  Continue with iron  Add Miralax 17G in 8 ounces water daily as needed for constipation secondary to iron therapy    BP improved but peripheral edema persists, minimal response to hydrochlorothiazide  Increase hydrochlorothiazide to 25 mg. Continue high potassium foods.   Follow up 2 weeks. Monitor weight  Compression stockings reordered as did not receive  Elevate legs at rest    Skin and nasal mucosa irritation  Apply small amount bacitracin to left nare and left external ear 2-3 times per day for 7-10 days. Seek care if worsening    Impacted cerumen  Left ear irrigation

## 2024-01-23 NOTE — NURSING NOTE
"ROOM:2  [facility]    Preferred Name: Kylie     How did you hear about us?  Current Patient    54 year old  Chief Complaint   Patient presents with     Results     Clinic Care Coordination - Follow-up     Patient is here for a follow up. The patient mentions that she has a sore in her nose and her right foot is swollen.        Blood pressure 136/75, pulse 75, temperature 98.8  F (37.1  C), temperature source Oral, resp. rate 16, height 1.615 m (5' 3.6\"), weight 72.8 kg (160 lb 8 oz), SpO2 98%. Body mass index is 27.9 kg/m .  BP completed using cuff size:        There is no problem list on file for this patient.      Wt Readings from Last 2 Encounters:   01/23/24 72.8 kg (160 lb 8 oz)   01/16/24 73.1 kg (161 lb 1.6 oz)     BP Readings from Last 3 Encounters:   01/23/24 136/75   01/16/24 (!) 153/83       Allergies   Allergen Reactions     Animal Dander Rash     \"Pets\"       Current Outpatient Medications   Medication     albuterol (PROAIR HFA/PROVENTIL HFA/VENTOLIN HFA) 108 (90 Base) MCG/ACT inhaler     amLODIPine (NORVASC) 5 MG tablet     ARIPiprazole (ABILIFY) 5 MG tablet     aspirin (ASA) 81 MG chewable tablet     citalopram (CELEXA) 40 MG tablet     doxepin (SINEQUAN) 25 MG capsule     ferrous sulfate (FEROSUL) 325 (65 Fe) MG tablet     folic acid (FOLVITE) 1 MG tablet     gabapentin (NEURONTIN) 300 MG capsule     hydrochlorothiazide (HYDRODIURIL) 12.5 MG tablet     hydrOXYzine eber (VISTARIL) 25 MG capsule     ibuprofen (ADVIL/MOTRIN) 600 MG tablet     Multiple Vitamins-Minerals (MULTIVITAMIN ADULTS) TABS     naltrexone (DEPADE/REVIA) 50 MG tablet     nicotine (NICODERM CQ) 21 MG/24HR 24 hr patch     nicotine (NICORETTE) 4 MG lozenge     omeprazole (PRILOSEC) 20 MG DR capsule     thiamine (B-1) 100 MG tablet     traZODone (DESYREL) 100 MG tablet     vitamin D3 (CHOLECALCIFEROL) 50 mcg (2000 units) tablet     No current facility-administered medications for this visit.       Social History     Tobacco " "Use     Smoking status: Former     Packs/day: 2.00     Years: 35.00     Additional pack years: 0.00     Total pack years: 70.00     Types: Cigarettes     Smokeless tobacco: Former     Tobacco comments:     Quit 2 months ago  11/2023   Vaping Use     Vaping Use: Never used   Substance Use Topics     Alcohol use: Not Currently     Comment: 10-12 a day     Drug use: Never       Honoring Choices - Health Care Directive Guide offered to patient at time of visit.    Health Maintenance Due   Topic Date Due     ADVANCE CARE PLANNING  Never done     COLORECTAL CANCER SCREENING  Never done     HIV SCREENING  Never done     HEPATITIS C SCREENING  Never done     PAP  Never done     YEARLY PREVENTIVE VISIT  08/09/2007     MAMMO SCREENING  08/08/2017     ZOSTER IMMUNIZATION (1 of 2) Never done     INFLUENZA VACCINE (1) 09/01/2023     COVID-19 Vaccine (4 - 2023-24 season) 09/01/2023     LUNG CANCER SCREENING  12/08/2023       Immunization History   Administered Date(s) Administered     COVID-19 Bivalent 12+ (Pfizer) 10/27/2022     COVID-19 MONOVALENT 12+ (Pfizer) 01/07/2021, 01/28/2021     Flu, Unspecified 10/10/2010     Hep B, Adult (Heplisav- B) 10/07/2022     HepB, Unspecified 01/09/2020     Hepatitis B, Adult 02/12/2020     Influenza (IIV3) PF 09/28/2011     Influenza (intradermal) 09/28/2020     Influenza Intranasal Vaccine 10/19/2010     Influenza Vaccine >6 months,quad, PF 01/08/2020, 10/03/2022     MMR 01/16/2020, 02/12/2020     Pneumococcal 20 valent Conjugate (Prevnar 20) 12/14/2022     Pneumococcal 23 valent 06/02/2011     TDAP (Adacel,Boostrix) 06/02/2011, 10/03/2022     Td (Adult), Adsorbed 12/14/2004       No results found for: \"PAP\"    Recent Labs   Lab Test 01/16/24  1035   A1C 5.1   LDL 96   HDL 49*   TRIG 110   ALT 20   CR 0.55   GFRESTIMATED >90   ALBUMIN 4.3   POTASSIUM 4.3   TSH 2.03           1/23/2024     9:46 AM 1/16/2024     9:22 AM   PHQ-2 ( 1999 Pfizer)   Q1: Little interest or pleasure in doing things 1 " 1   Q2: Feeling down, depressed or hopeless 1 1   PHQ-2 Score 2 2   Q1: Little interest or pleasure in doing things  Several days   Q2: Feeling down, depressed or hopeless  Several days   PHQ-2 Score  2           1/16/2024     9:21 AM   PHQ-9 SCORE   PHQ-9 Total Score 18           1/16/2024     9:21 AM   BOLA-7 SCORE   Total Score 14            No data to display                Jennifer Beth    January 23, 2024 9:47 AM

## 2024-01-24 LAB — POTASSIUM SERPL-SCNC: 4 MMOL/L (ref 3.4–5.3)

## 2024-02-05 NOTE — PROGRESS NOTES
[facility] FOLLOW UP VISIT    Patient: Kylie Dietz YOB: 1970    Date of Exam: 2/06/24    Arrival Time: 08 38 AM  Departure Time: Uncertain    Charge Phone (written on paperwork):  not recorded    Please be advised that this client resides in a facility in which narcotic medications are not permitted. If pain management is needed, please prescribe an alternative medication.       Assessment & Plan   Problem List Items Addressed This Visit    None  Visit Diagnoses       Iron deficiency anemia, unspecified iron deficiency anemia type    -  Primary    Relevant Orders    IRON AND IRON BINDING CAPACITY    Ferritin    Transferrin    CBC with platelets differential    Cough, unspecified type        Relevant Medications    budesonide-formoterol (SYMBICORT) 80-4.5 MCG/ACT Inhaler    Other Relevant Orders    Symptomatic Influenza A/B, RSV, & SARS-CoV2 PCR (COVID-19) Nasopharyngeal    CBC with platelets differential    N terminal pro BNP outpatient    XR CHEST 2 VW    Other fatigue        Relevant Orders    Echocardiogram Complete    CBC with platelets differential    N terminal pro BNP outpatient    Neuropathy        Relevant Orders    Vitamin D Level    Magnesium    Edema, unspecified type        Relevant Medications    budesonide-formoterol (SYMBICORT) 80-4.5 MCG/ACT Inhaler    Other Relevant Orders    Albumin Random Urine Quantitative with Creat Ratio    Echocardiogram Complete    N terminal pro BNP outpatient    XR CHEST 2 VW    Comprehensive metabolic panel    Medication management        Shortness of breath        Relevant Medications    budesonide-formoterol (SYMBICORT) 80-4.5 MCG/ACT Inhaler    Rash        Relevant Medications    budesonide-formoterol (SYMBICORT) 80-4.5 MCG/ACT Inhaler    hydrocortisone (CORTAID) 1 % external cream           Peripheral edema etiology uncertain. Has not improved with hydrochlorothiazide management, although BP better. Has not gotten  "compression stockings yet and this is recommended, Continue to elevate legs at HS.  Overall feeling slightly SOB with activity, reports  has continued cough and congestion with unremarkable pulmonary findings on exam other than reduced SpO2 compared to baseline. Added Symbicort to regimen given occasional  dyspnea on exertion and repeated use of Albuterol.  Weight increasing despite patient report of eating less and trying to chose low fat options. Will assess cardiopulmonary status with Echo and CXR. May need cardiac referral. Consider CCB as etiology for persistent peripheral edema although has been on it awhile. Might benefit from reduced dose to low maintenance only for underlying raynaud's disease. Also history anemia which may be causing some of symptoms. Do anemia and metabolic repeat labs. Continue iron for now.   Rash on face: very dry, will try HC 1% cream 1-2 times per day. Use mild soaps. Does not appear to be impetigo related to nasal lesions seen at last encounter and no change to bacitracin that patient has been applying. Could be local response to bacitracin also. To notify me if worsens. Low suspicion for herpes, but monitor.      BMI  Estimated body mass index is 28.99 kg/m  as calculated from the following:    Height as of this encounter: 1.603 m (5' 3.1\").    Weight as of this encounter: 74.5 kg (164 lb 3.2 oz).   Weight management plan: Discussed healthy diet and exercise guidelines Offered referral to weight management, declined    Follow up after Echo and CXR        Kylie Dietz is a 54 year old who presents for the following    Subjective   Kylie is a 54 year old, presenting for the following health issues:  Clinic Care Coordination - Follow-up (Water weight, pre-diabetes)         No data to display                HPI     54 year-old female with history of overweight, HTN, pre-DM, JOANNE and edema presents for followup.  Has multiple concerns:  Head and nasal congestion with occasional " "dyspnea on exertion. Denies chest pain. 2 weeks ago developed sore throat followed by nasal and head congestion and productive cough. Does not feel SOB usually. Cannot do usual activities because ankles are so swollen and painful by end of the day, not due to SOB  Peripheral edema: Trying to elevate legs. Has not yet gotten compression stockings yet. OK in am but very painful by then end of the day. Never not swollen.   JOANNE: taking iron and tolerating well.  Feels fatigued overall. Trying to increase iron in diet.    ROS: denies fever, chills. Wbc was elevated at last encounter but had just had tooth infection. Will recheck today. Has gained weight.   HEENT: as per HPI  RESP: as per HPI. Has been using albuterol daily for control of symptoms with only partial relieve. Reports cough is productive greenish-yellow phlegm.   CV: negative CP, irregular heart beat. Peripheral edema as per HPI. History Raynauds  ABD: constipation when first started iron but resolved.  Taking omeprazole which helps her tolerate iron.   NEURO: occasional numbness tingling in extremities. Negative for headache, dizziness.   Extremities: Swelling bilaterally to level of knee, pain but no redness or warmth, no discoloration or skin lesions.   SKIN: new rash along mouth left side. Has been applying bacitracin. Lesions inside nose have healed.       Objective    /76 (BP Location: Left arm, Patient Position: Sitting, Cuff Size: Adult Regular)   Pulse 75   Temp 98  F (36.7  C) (Oral)   Resp 16   Ht 1.603 m (5' 3.1\")   Wt 74.5 kg (164 lb 3.2 oz)   SpO2 93%   BMI 28.99 kg/m    Body mass index is 28.99 kg/m .  Physical Exam   GEN: alert female in NAD  SKIN: 3 erythematous papules along right mouth/cheek area and one on left. Raised, rough and dry to touch.  Nose: no lesions noted. Mild erythema  HEENT: Eyes: conjunctivae pale. Eyes clear, EARS: TMs gray with LR. Nose: scant white yellow nasal discharge. Negative tenderness to palpation " over sinuses  OP: moist, pink no lesions or tonsillar enlargement.   NECK Supple. Lymph negative.   RESP: Lungs CTA with air entry throughout. SpO2 93% Respirations unlabored. Speech is normal.   CV: HRRR, S1, S2, no MRG. Peripheral edema: +1-2 to just below ankle, left ankle worse than right, somewhat taut. Mild erythema feet but no skin changes LE. Negative calf tenderness, redness or warmth. Sensation in tact and sensitive to touch.       Referrals Made:   Referral to ECHO within next 1 week if possible, CXR within next 1 week  If a referral was made to a [facility] and you don't get a call from central scheduling please call [phone].  If a Mammogram was ordered for you at [facility] call [phone] to schedule or change your appointment.  If you had an XRay/CT/Ultrasound/MRI ordered the number is [phone] to schedule or change your radiology appointment.     Please make an in person follow up visit with ME, [unfilled], in after ECHO and CXR to discuss Results and for continued management    Medication changes made at today's visit: MEDICATIONS:   Orders Placed This Encounter   Medications    budesonide-formoterol (SYMBICORT) 80-4.5 MCG/ACT Inhaler     Sig: Inhale 2 puffs into the lungs 2 times daily     Dispense:  10.2 g     Refill:  1    hydrocortisone (CORTAID) 1 % external cream     Sig: Apply lightly to affected area twice daily for 1-2 weeks     Dispense:  15 g     Refill:  0     Medications Discontinued During This Encounter   Medication Reason    nicotine (NICODERM CQ) 21 MG/24HR 24 hr patch Stopped by Patient (No AVS)          - Continue other medications without change    AGATHA Bass CNP     Labs show continued iron deficiency and anemia. Referred to hematology. Will likely need iron infusion. Vitamin D low normal and will replace. BNP is WNL. Fatigue likely due to anemia and suspect peripheral edema likely due to this as well. [facility] needed appointments. Elsa SNIDER CNP

## 2024-02-06 ENCOUNTER — OFFICE VISIT (OUTPATIENT)
Dept: FAMILY MEDICINE | Facility: CLINIC | Age: 54
End: 2024-02-06
Payer: COMMERCIAL

## 2024-02-06 VITALS
WEIGHT: 164.2 LBS | SYSTOLIC BLOOD PRESSURE: 132 MMHG | OXYGEN SATURATION: 93 % | DIASTOLIC BLOOD PRESSURE: 76 MMHG | HEART RATE: 75 BPM | RESPIRATION RATE: 16 BRPM | BODY MASS INDEX: 29.09 KG/M2 | HEIGHT: 63 IN | TEMPERATURE: 98 F

## 2024-02-06 DIAGNOSIS — R60.0 PERIPHERAL EDEMA: ICD-10-CM

## 2024-02-06 DIAGNOSIS — R21 RASH: ICD-10-CM

## 2024-02-06 DIAGNOSIS — R05.2 SUBACUTE COUGH: ICD-10-CM

## 2024-02-06 DIAGNOSIS — R05.9 COUGH, UNSPECIFIED TYPE: ICD-10-CM

## 2024-02-06 DIAGNOSIS — R53.83 OTHER FATIGUE: ICD-10-CM

## 2024-02-06 DIAGNOSIS — D50.9 IRON DEFICIENCY ANEMIA, UNSPECIFIED IRON DEFICIENCY ANEMIA TYPE: Primary | ICD-10-CM

## 2024-02-06 DIAGNOSIS — R06.02 SHORTNESS OF BREATH: ICD-10-CM

## 2024-02-06 DIAGNOSIS — Z79.899 MEDICATION MANAGEMENT: ICD-10-CM

## 2024-02-06 DIAGNOSIS — R60.9 EDEMA, UNSPECIFIED TYPE: ICD-10-CM

## 2024-02-06 DIAGNOSIS — G62.9 NEUROPATHY: ICD-10-CM

## 2024-02-06 LAB
BASOPHILS # BLD AUTO: 0.2 10E3/UL (ref 0–0.2)
BASOPHILS NFR BLD AUTO: 1 %
CREAT UR-MCNC: 23.5 MG/DL
EOSINOPHIL # BLD AUTO: 0.5 10E3/UL (ref 0–0.7)
EOSINOPHIL NFR BLD AUTO: 4 %
ERYTHROCYTE [DISTWIDTH] IN BLOOD BY AUTOMATED COUNT: 19.8 % (ref 10–15)
FLUAV RNA SPEC QL NAA+PROBE: NEGATIVE
FLUBV RNA RESP QL NAA+PROBE: NEGATIVE
HCT VFR BLD AUTO: 27.6 % (ref 35–47)
HGB BLD-MCNC: 7.7 G/DL (ref 11.7–15.7)
IMM GRANULOCYTES # BLD: 0.1 10E3/UL
IMM GRANULOCYTES NFR BLD: 1 %
LYMPHOCYTES # BLD AUTO: 1.8 10E3/UL (ref 0.8–5.3)
LYMPHOCYTES NFR BLD AUTO: 15 %
MCH RBC QN AUTO: 19.8 PG (ref 26.5–33)
MCHC RBC AUTO-ENTMCNC: 27.9 G/DL (ref 31.5–36.5)
MCV RBC AUTO: 71 FL (ref 78–100)
MICROALBUMIN UR-MCNC: <12 MG/L
MICROALBUMIN/CREAT UR: NORMAL MG/G{CREAT}
MONOCYTES # BLD AUTO: 1.5 10E3/UL (ref 0–1.3)
MONOCYTES NFR BLD AUTO: 12 %
NEUTROPHILS # BLD AUTO: 7.9 10E3/UL (ref 1.6–8.3)
NEUTROPHILS NFR BLD AUTO: 67 %
NRBC # BLD AUTO: 0 10E3/UL
NRBC BLD AUTO-RTO: 0 /100
PLATELET # BLD AUTO: 493 10E3/UL (ref 150–450)
RBC # BLD AUTO: 3.88 10E6/UL (ref 3.8–5.2)
RSV RNA SPEC NAA+PROBE: NEGATIVE
SARS-COV-2 RNA RESP QL NAA+PROBE: NEGATIVE
WBC # BLD AUTO: 11.8 10E3/UL (ref 4–11)

## 2024-02-06 PROCEDURE — 82043 UR ALBUMIN QUANTITATIVE: CPT | Mod: ORL | Performed by: NURSE PRACTITIONER

## 2024-02-06 PROCEDURE — 82040 ASSAY OF SERUM ALBUMIN: CPT | Mod: ORL | Performed by: NURSE PRACTITIONER

## 2024-02-06 PROCEDURE — 82306 VITAMIN D 25 HYDROXY: CPT | Mod: ORL | Performed by: NURSE PRACTITIONER

## 2024-02-06 PROCEDURE — 83880 ASSAY OF NATRIURETIC PEPTIDE: CPT | Mod: ORL | Performed by: NURSE PRACTITIONER

## 2024-02-06 PROCEDURE — 84466 ASSAY OF TRANSFERRIN: CPT | Mod: ORL | Performed by: NURSE PRACTITIONER

## 2024-02-06 PROCEDURE — 85025 COMPLETE CBC W/AUTO DIFF WBC: CPT | Mod: ORL | Performed by: NURSE PRACTITIONER

## 2024-02-06 PROCEDURE — 87637 SARSCOV2&INF A&B&RSV AMP PRB: CPT | Mod: ORL | Performed by: NURSE PRACTITIONER

## 2024-02-06 PROCEDURE — 83735 ASSAY OF MAGNESIUM: CPT | Mod: ORL | Performed by: NURSE PRACTITIONER

## 2024-02-06 PROCEDURE — 82728 ASSAY OF FERRITIN: CPT | Mod: ORL | Performed by: NURSE PRACTITIONER

## 2024-02-06 PROCEDURE — 83550 IRON BINDING TEST: CPT | Mod: ORL | Performed by: NURSE PRACTITIONER

## 2024-02-06 RX ORDER — BUDESONIDE AND FORMOTEROL FUMARATE DIHYDRATE 80; 4.5 UG/1; UG/1
2 AEROSOL RESPIRATORY (INHALATION) 2 TIMES DAILY
Qty: 10.2 G | Refills: 1 | Status: SHIPPED | OUTPATIENT
Start: 2024-02-06

## 2024-02-06 RX ORDER — BENZOCAINE/MENTHOL 6 MG-10 MG
LOZENGE MUCOUS MEMBRANE
Qty: 15 G | Refills: 0 | Status: SHIPPED | OUTPATIENT
Start: 2024-02-06

## 2024-02-06 ASSESSMENT — PAIN SCALES - GENERAL: PAINLEVEL: NO PAIN (0)

## 2024-02-06 NOTE — NURSING NOTE
"ROOM:1  BERT BLANCH[facility]    Preferred Name: Kylie     How did you hear about us?  Current Patient     Services Provided? No    54 year old  Chief Complaint   Patient presents with     Clinic Care Coordination - Follow-up     Water weight, pre-diabetes       Blood pressure 132/76, pulse 75, temperature 98  F (36.7  C), temperature source Oral, resp. rate 16, height 1.603 m (5' 3.1\"), weight 74.5 kg (164 lb 3.2 oz), SpO2 93%. Body mass index is 28.99 kg/m .  BP completed using cuff size:        There is no problem list on file for this patient.      Wt Readings from Last 2 Encounters:   02/06/24 74.5 kg (164 lb 3.2 oz)   01/23/24 72.8 kg (160 lb 8 oz)     BP Readings from Last 3 Encounters:   02/06/24 132/76   01/23/24 136/75   01/16/24 (!) 153/83       Allergies   Allergen Reactions     Animal Dander Rash     \"Pets\"       Current Outpatient Medications   Medication     albuterol (PROAIR HFA/PROVENTIL HFA/VENTOLIN HFA) 108 (90 Base) MCG/ACT inhaler     amLODIPine (NORVASC) 5 MG tablet     ARIPiprazole (ABILIFY) 5 MG tablet     aspirin (ASA) 81 MG chewable tablet     citalopram (CELEXA) 40 MG tablet     doxepin (SINEQUAN) 25 MG capsule     ferrous sulfate (FEROSUL) 325 (65 Fe) MG tablet     gabapentin (NEURONTIN) 300 MG capsule     hydrochlorothiazide (HYDRODIURIL) 25 MG tablet     hydrOXYzine eber (VISTARIL) 25 MG capsule     ibuprofen (ADVIL/MOTRIN) 600 MG tablet     Multiple Vitamins-Minerals (MULTIVITAMIN ADULTS) TABS     naltrexone (DEPADE/REVIA) 50 MG tablet     nicotine (NICODERM CQ) 21 MG/24HR 24 hr patch     nicotine (NICORETTE) 4 MG lozenge     omeprazole (PRILOSEC) 20 MG DR capsule     polyethylene glycol (MIRALAX) 17 GM/Dose powder     traZODone (DESYREL) 100 MG tablet     vitamin D3 (CHOLECALCIFEROL) 50 mcg (2000 units) tablet     bacitracin 500 UNIT/GM ophthalmic ointment     folic acid (FOLVITE) 1 MG tablet     thiamine (B-1) 100 MG tablet     No current facility-administered medications " "for this visit.       Social History     Tobacco Use     Smoking status: Former     Packs/day: 2.00     Years: 35.00     Additional pack years: 0.00     Total pack years: 70.00     Types: Cigarettes     Smokeless tobacco: Former     Tobacco comments:     Quit 2 months ago  11/2023   Vaping Use     Vaping Use: Never used   Substance Use Topics     Alcohol use: Not Currently     Comment: 10-12 a day     Drug use: Never       Honoring Choices - Health Care Directive Guide offered to patient at time of visit.    Health Maintenance Due   Topic Date Due     ADVANCE CARE PLANNING  Never done     COLORECTAL CANCER SCREENING  Never done     HIV SCREENING  Never done     HEPATITIS C SCREENING  Never done     PAP  Never done     YEARLY PREVENTIVE VISIT  08/09/2007     MAMMO SCREENING  08/08/2018     ZOSTER IMMUNIZATION (1 of 2) Never done     INFLUENZA VACCINE (1) 09/01/2023     COVID-19 Vaccine (4 - 2023-24 season) 09/01/2023     LUNG CANCER SCREENING  12/08/2023       Immunization History   Administered Date(s) Administered     COVID-19 Bivalent 12+ (Pfizer) 10/27/2022     COVID-19 MONOVALENT 12+ (Pfizer) 01/07/2021, 01/28/2021     Flu, Unspecified 10/10/2010     Hep B, Adult (Heplisav- B) 10/07/2022     HepB, Unspecified 01/09/2020     Hepatitis B, Adult 02/12/2020     Influenza (IIV3) PF 09/28/2011     Influenza (intradermal) 09/28/2020     Influenza Intranasal Vaccine 10/19/2010     Influenza Vaccine >6 months,quad, PF 01/08/2020, 10/03/2022     MMR 01/16/2020, 02/12/2020     Pneumococcal 20 valent Conjugate (Prevnar 20) 12/14/2022     Pneumococcal 23 valent 06/02/2011     TDAP (Adacel,Boostrix) 06/02/2011, 10/03/2022     Td (Adult), Adsorbed 12/14/2004       No results found for: \"PAP\"    Recent Labs   Lab Test 01/23/24  1117 01/16/24  1035   A1C  --  5.1   LDL  --  96   HDL  --  49*   TRIG  --  110   ALT  --  20   CR  --  0.55   GFRESTIMATED  --  >90   ALBUMIN  --  4.3   POTASSIUM 4.0 4.3   TSH  --  2.03           " 1/23/2024     9:46 AM 1/16/2024     9:22 AM   PHQ-2 ( 1999 Pfizer)   Q1: Little interest or pleasure in doing things 1 1   Q2: Feeling down, depressed or hopeless 1 1   PHQ-2 Score 2 2   Q1: Little interest or pleasure in doing things  Several days   Q2: Feeling down, depressed or hopeless  Several days   PHQ-2 Score  2           1/16/2024     9:21 AM   PHQ-9 SCORE   PHQ-9 Total Score 18           1/16/2024     9:21 AM   BOLA-7 SCORE   Total Score 14            No data to display                Jennifer Beth    February 6, 2024 8:50 AM

## 2024-02-06 NOTE — LETTER
February 7, 2024      Kylie Dietz  [address] [address]        Dear ,    We are writing to inform you of your test results.    Kylie, your iron level has not come up. I referred you to hematology. I think you will need an iron infusion. I asked them to schedule you urgently. Testing for respiratory infectious diseases negative. Your white blood cell count has come down a little bit but still a little high.     Resulted Orders   Symptomatic Influenza A/B, RSV, & SARS-CoV2 PCR (COVID-19) Nasopharyngeal   Result Value Ref Range    Influenza A PCR Negative Negative    Influenza B PCR Negative Negative    RSV PCR Negative Negative    SARS CoV2 PCR Negative Negative      Comment:      NEGATIVE: SARS-CoV-2 (COVID-19) RNA not detected, presumed negative.    Narrative    Testing was performed using the Xpert Xpress CoV2/Flu/RSV Assay on the C[employer] GeneXpert Instrument. This test should be ordered for the detection of SARS-CoV-2, influenza, and RSV viruses in individuals who meet clinical and/or epidemiological criteria. Test performance is unknown in asymptomatic patients. This test is for in vitro diagnostic use under the FDA EUA for laboratories certified under CLIA to perform high or moderate complexity testing. This test has not been FDA cleared or approved. A negative result does not rule out the presence of PCR inhibitors in the specimen or target RNA in concentration below the limit of detection for the assay. If only one viral target is positive but coinfection with multiple targets is suspected, the sample should be re-tested with another FDA cleared, approved, or authorized test, if coinfection would change clinical management. This test was validated by the [facility]. These laboratories are certified under the Clinical Laboratory Improvement Amendments of 1988 (CLIA-88) as qualified to perform high complexity laboratory testing.   Albumin Random Urine  Quantitative with Creat Ratio   Result Value Ref Range    Creatinine Urine mg/dL 23.5 mg/dL      Comment:      The reference ranges have not been established in urine creatinine. The results should be integrated into the clinical context for interpretation.    Albumin Urine mg/L <12.0 mg/L      Comment:      The reference ranges have not been established in urine albumin. The results should be integrated into the clinical context for interpretation.    Albumin Urine mg/g Cr        Comment:      Unable to calculate, urine albumin and/or urine creatinine is outside detectable limits.  Microalbuminuria is defined as an albumin:creatinine ratio of 17 to 299 for males and 25 to 299 for females. A ratio of albumin:creatinine of 300 or higher is indicative of overt proteinuria.  Due to biologic variability, positive results should be confirmed by a second, first-morning random or 24-hour timed urine specimen. If there is discrepancy, a third specimen is recommended. When 2 out of 3 results are in the microalbuminuria range, this is evidence for incipient nephropathy and warrants increased efforts at glucose control, blood pressure control, and institution of therapy with an angiotensin-converting-enzyme (ACE) inhibitor (if the patient can tolerate it).     CBC with platelets and differential   Result Value Ref Range    WBC Count 11.8 (H) 4.0 - 11.0 10e3/uL    RBC Count 3.88 3.80 - 5.20 10e6/uL    Hemoglobin 7.7 (L) 11.7 - 15.7 g/dL    Hematocrit 27.6 (L) 35.0 - 47.0 %    MCV 71 (L) 78 - 100 fL    MCH 19.8 (L) 26.5 - 33.0 pg    MCHC 27.9 (L) 31.5 - 36.5 g/dL    RDW 19.8 (H) 10.0 - 15.0 %    Platelet Count 493 (H) 150 - 450 10e3/uL    % Neutrophils 67 %    % Lymphocytes 15 %    % Monocytes 12 %    % Eosinophils 4 %    % Basophils 1 %    % Immature Granulocytes 1 %    NRBCs per 100 WBC 0 <1 /100    Absolute Neutrophils 7.9 1.6 - 8.3 10e3/uL    Absolute Lymphocytes 1.8 0.8 - 5.3 10e3/uL    Absolute Monocytes 1.5 (H) 0.0 - 1.3  10e3/uL    Absolute Eosinophils 0.5 0.0 - 0.7 10e3/uL    Absolute Basophils 0.2 0.0 - 0.2 10e3/uL    Absolute Immature Granulocytes 0.1 <=0.4 10e3/uL    Absolute NRBCs 0.0 10e3/uL       If you have any questions or concerns, please call the clinic at the number listed above.       Sincerely,      AGATHA Bass CNP

## 2024-02-06 NOTE — PATIENT INSTRUCTIONS
Lower leg edema  - We will check labs, echocardiogram  - Wear compression stockings as much as possible during the day  - Elevate legs whenever possible  - Take breaks from standing  - Move legs at least once every hour    Iron deficiency anemia  - We will repeat labs  - Continue taking iron as prescribed    Cough  - We will check labs, chest x-ray  - Take two puffs of Symbicort twice a day  - Continue self care, such as staying hydrated and using a humidifier to help with congestion and sore throat    Rash  - Stop using bacitracin on face  - Wash face with a mild, fragrance free product (CeraVe, Cetaphil, etc)  - Apply light layer of hydrocortisone to the affected areas only twice a day for one week  - If rash worsens or spreads, notify the provider  - Stop hydrocortisone if it worsens the rash    Follow up  - Please return to the clinic after you have had your echo and chest xray (preferably done within one week)

## 2024-02-06 NOTE — LETTER
February 9, 2024      Kylie Dietz  [address] [address]        Dear ,    We are writing to inform you of your test results.    I sent some of your results already. Your vitamin D level is borderline low. I have sent a prescription for vitamin D replacement to your pharmacy. The measure to see if your heart is overloaded is within normal limits. Your iron studies are all low and you remain anemic, so I sent a referral to hematology for possible iron infusion. That might make you feel better.  The nurse there should help set that up. Please return if you have questions. [phone]. Thank you    Resulted Orders   Vitamin D Level   Result Value Ref Range    Vitamin D, Total (25-Hydroxy) 27 20 - 50 ng/mL      Comment:      optimum levels    Narrative    Season, race, dietary intake, and treatment affect the concentration of 25-hydroxy-Vitamin D. Values may decrease during winter months and increase during summer months.    Vitamin D determination is routinely performed by an immunoassay specific for 25 hydroxyvitamin D3.  If an individual is on vitamin D2(ergocalciferol) supplementation, please specify 25 OH vitamin D2 and D3 level determination by LCMSMS test VITD23.     Magnesium   Result Value Ref Range    Magnesium 1.8 1.7 - 2.3 mg/dL   IRON AND IRON BINDING CAPACITY   Result Value Ref Range    Iron 15 (L) 37 - 145 ug/dL    Iron Binding Capacity 387 240 - 430 ug/dL    Iron Sat Index 4 (L) 15 - 46 %   Ferritin   Result Value Ref Range    Ferritin 13 11 - 328 ng/mL   Transferrin   Result Value Ref Range    Transferrin 311.0 200.0 - 360.0 mg/dL   Symptomatic Influenza A/B, RSV, & SARS-CoV2 PCR (COVID-19) Nasopharyngeal   Result Value Ref Range    Influenza A PCR Negative Negative    Influenza B PCR Negative Negative    RSV PCR Negative Negative    SARS CoV2 PCR Negative Negative      Comment:      NEGATIVE: SARS-CoV-2 (COVID-19) RNA not detected, presumed negative.    Narrative     Testing was performed using the Xpert Xpress CoV2/Flu/RSV Assay on the [employer] GeneXpert Instrument. This test should be ordered for the detection of SARS-CoV-2, influenza, and RSV viruses in individuals who meet clinical and/or epidemiological criteria. Test performance is unknown in asymptomatic patients. This test is for in vitro diagnostic use under the FDA EUA for laboratories certified under CLIA to perform high or moderate complexity testing. This test has not been FDA cleared or approved. A negative result does not rule out the presence of PCR inhibitors in the specimen or target RNA in concentration below the limit of detection for the assay. If only one viral target is positive but coinfection with multiple targets is suspected, the sample should be re-tested with another FDA cleared, approved, or authorized test, if coinfection would change clinical management. This test was validated by the [facility]. These laboratories are certified under the Clinical Laboratory Improvement Amendments of 1988 (CLIA-88) as qualified to perform high complexity laboratory testing.   Albumin Random Urine Quantitative with Creat Ratio   Result Value Ref Range    Creatinine Urine mg/dL 23.5 mg/dL      Comment:      The reference ranges have not been established in urine creatinine. The results should be integrated into the clinical context for interpretation.    Albumin Urine mg/L <12.0 mg/L      Comment:      The reference ranges have not been established in urine albumin. The results should be integrated into the clinical context for interpretation.    Albumin Urine mg/g Cr        Comment:      Unable to calculate, urine albumin and/or urine creatinine is outside detectable limits.  Microalbuminuria is defined as an albumin:creatinine ratio of 17 to 299 for males and 25 to 299 for females. A ratio of albumin:creatinine of 300 or higher is indicative of overt proteinuria.  Due to biologic variability,  positive results should be confirmed by a second, first-morning random or 24-hour timed urine specimen. If there is discrepancy, a third specimen is recommended. When 2 out of 3 results are in the microalbuminuria range, this is evidence for incipient nephropathy and warrants increased efforts at glucose control, blood pressure control, and institution of therapy with an angiotensin-converting-enzyme (ACE) inhibitor (if the patient can tolerate it).     N terminal pro BNP outpatient   Result Value Ref Range    N Terminal Pro BNP Outpatient 86 0 - 900 pg/mL      Comment:      Reference range shown and results flagged as abnormal are for the outpatient, non acute settings. Establishing a baseline value for each individual patient is useful for follow-up.    Suggested inpatient cut points for confirming diagnosis of CHF in an acute setting are:  >450 pg/mL (age 18 to less than 50)  >900 pg/mL (age 50 to less than 75)  >1800 pg/mL (75 yrs and older)    An inpatient or emergency department NT-proPBNP <300 pg/mL effectively rules out acute CHF, with 99% negative predictive value.       Comprehensive metabolic panel   Result Value Ref Range    Sodium 135 135 - 145 mmol/L      Comment:      Reference intervals for this test were updated on 09/26/2023 to more accurately reflect our healthy population. There may be differences in the flagging of prior results with similar values performed with this method. Interpretation of those prior results can be made in the context of the updated reference intervals.     Potassium 4.0 3.4 - 5.3 mmol/L    Carbon Dioxide (CO2) 26 22 - 29 mmol/L    Anion Gap 12 7 - 15 mmol/L    Urea Nitrogen 12.3 6.0 - 20.0 mg/dL    Creatinine 0.52 0.51 - 0.95 mg/dL    GFR Estimate >90 >60 mL/min/1.73m2    Calcium 9.1 8.6 - 10.0 mg/dL    Chloride 97 (L) 98 - 107 mmol/L    Glucose 84 70 - 99 mg/dL    Alkaline Phosphatase 57 40 - 150 U/L      Comment:      Reference intervals for this test were updated on  11/14/2023 to more accurately reflect our healthy population. There may be differences in the flagging of prior results with similar values performed with this method. Interpretation of those prior results can be made in the context of the updated reference intervals.    AST 21 0 - 45 U/L      Comment:      Reference intervals for this test were updated on 6/12/2023 to more accurately reflect our healthy population. There may be differences in the flagging of prior results with similar values performed with this method. Interpretation of those prior results can be made in the context of the updated reference intervals.    ALT 15 0 - 50 U/L      Comment:      Reference intervals for this test were updated on 6/12/2023 to more accurately reflect our healthy population. There may be differences in the flagging of prior results with similar values performed with this method. Interpretation of those prior results can be made in the context of the updated reference intervals.      Protein Total 6.7 6.4 - 8.3 g/dL    Albumin 4.0 3.5 - 5.2 g/dL    Bilirubin Total 0.2 <=1.2 mg/dL   CBC with platelets and differential   Result Value Ref Range    WBC Count 11.8 (H) 4.0 - 11.0 10e3/uL    RBC Count 3.88 3.80 - 5.20 10e6/uL    Hemoglobin 7.7 (L) 11.7 - 15.7 g/dL    Hematocrit 27.6 (L) 35.0 - 47.0 %    MCV 71 (L) 78 - 100 fL    MCH 19.8 (L) 26.5 - 33.0 pg    MCHC 27.9 (L) 31.5 - 36.5 g/dL    RDW 19.8 (H) 10.0 - 15.0 %    Platelet Count 493 (H) 150 - 450 10e3/uL    % Neutrophils 67 %    % Lymphocytes 15 %    % Monocytes 12 %    % Eosinophils 4 %    % Basophils 1 %    % Immature Granulocytes 1 %    NRBCs per 100 WBC 0 <1 /100    Absolute Neutrophils 7.9 1.6 - 8.3 10e3/uL    Absolute Lymphocytes 1.8 0.8 - 5.3 10e3/uL    Absolute Monocytes 1.5 (H) 0.0 - 1.3 10e3/uL    Absolute Eosinophils 0.5 0.0 - 0.7 10e3/uL    Absolute Basophils 0.2 0.0 - 0.2 10e3/uL    Absolute Immature Granulocytes 0.1 <=0.4 10e3/uL    Absolute NRBCs 0.0 10e3/uL        If you have any questions or concerns, please call the clinic at the number listed above.       Sincerely,      AGATHA Bass CNP

## 2024-02-06 NOTE — TELEPHONE ENCOUNTER
PA Initiation    Medication: BACITRACIN 500 UNIT/GM OP OINT  Insurance Company: [employer] - Phone [phone] Fax [phone]  Pharmacy Filling the Rx: [facility] [place] [id number] - [place], [place] - [address] [place] [address], [id number]  Filling Pharmacy Phone: [phone]  Filling Pharmacy Fax: [phone]  Start Date: 2/2/2024

## 2024-02-06 NOTE — PROGRESS NOTES
Assessment & Plan   Problem List Items Addressed This Visit    None  Visit Diagnoses       Iron deficiency anemia, unspecified iron deficiency anemia type    -  Primary    Relevant Orders    IRON AND IRON BINDING CAPACITY    Ferritin    Transferrin    CBC with platelets differential    Cough, unspecified type        Relevant Medications    budesonide-formoterol (SYMBICORT) 80-4.5 MCG/ACT Inhaler    Other Relevant Orders    Symptomatic Influenza A/B, RSV, & SARS-CoV2 PCR (COVID-19) Nasopharyngeal    CBC with platelets differential    N terminal pro BNP outpatient    XR CHEST 2 VW    Other fatigue        Relevant Orders    Echocardiogram Complete    CBC with platelets differential    N terminal pro BNP outpatient    Neuropathy        Relevant Orders    Vitamin D Level    Magnesium    Edema, unspecified type        Relevant Medications    budesonide-formoterol (SYMBICORT) 80-4.5 MCG/ACT Inhaler    Other Relevant Orders    Albumin Random Urine Quantitative with Creat Ratio    Echocardiogram Complete    N terminal pro BNP outpatient    XR CHEST 2 VW    Comprehensive metabolic panel    Medication management        Shortness of breath        Relevant Medications    budesonide-formoterol (SYMBICORT) 80-4.5 MCG/ACT Inhaler    Rash        Relevant Medications    budesonide-formoterol (SYMBICORT) 80-4.5 MCG/ACT Inhaler    hydrocortisone (CORTAID) 1 % external cream           Peripheral edema etiology uncertain. Has not improved with hydrochlorothiazide management, although BP better. Has not gotten compression stockings yet and this is recommended, Continue to elevate legs at HS.  Overall feeling slightly SOB with activity, reports  has continued cough and congestion with unremarkable pulmonary findings on exam other than reduced SpO2 compared to baseline. Added Symbicort to regimen given occasional  dyspnea on exertion and repeated use of Albuterol.  Weight increasing despite patient report of eating less and trying to  "chose low fat options. Will assess cardiopulmonary status with Echo and CXR. May need cardiac referral. Consider CCB as etiology for persistent peripheral edema although has been on it awhile. Might benefit from reduced dose to low maintenance only for underlying raynaud's disease. Also history anemia which may be causing some of symptoms. Do anemia and metabolic repeat labs. Continue iron for now.   Rash on face: very dry, will try HC 1% cream 1-2 times per day. Use mild soaps. Does not appear to be impetigo related to nasal lesions seen at last encounter and no change to bacitracin that patient has been applying. Could be local response to bacitracin also. To notify me if worsens. Low suspicion for herpes, but monitor.      BMI  Estimated body mass index is 28.99 kg/m  as calculated from the following:    Height as of this encounter: 1.603 m (5' 3.1\").    Weight as of this encounter: 74.5 kg (164 lb 3.2 oz).   Weight management plan: Discussed healthy diet and exercise guidelines Offered referral to weight management, declined    Follow up after Echo and CXR          Subjective   Kylie is a 54 year old, presenting for the following health issues:  Clinic Care Coordination - Follow-up (Water weight, pre-diabetes)         No data to display                HPI     54 year-old female with history of overweight, HTN, pre-DM, JOANNE and edema presents for followup.  Has multiple concerns:  Head and nasal congestion with occasional dyspnea on exertion. Denies chest pain. 2 weeks ago developed sore throat followed by nasal and head congestion and productive cough. Does not feel SOB usually. Cannot do usual activities because ankles are so swollen and painful by end of the day, not due to SOB  Peripheral edema: Trying to elevate legs. Has not yet gotten compression stockings yet. OK in am but very painful by then end of the day. Never not swollen.   JOANNE: taking iron and tolerating well.  Feels fatigued overall. Trying to " "increase iron in diet.    ROS: denies fever, chills. Wbc was elevated at last encounter but had just had tooth infection. Will recheck today. Has gained weight.   HEENT: as per HPI  RESP: as per HPI. Has been using albuterol daily for control of symptoms with only partial relieve. Reports cough is productive greenish-yellow phlegm.   CV: negative CP, irregular heart beat. Peripheral edema as per HPI. History Raynauds  ABD: constipation when first started iron but resolved.  Taking omeprazole which helps her tolerate iron.   NEURO: occasional numbness tingling in extremities. Negative for headache, dizziness.   Extremities: Swelling bilaterally to level of knee, pain but no redness or warmth, no discoloration or skin lesions.   SKIN: new rash along mouth left side. Has been applying bacitracin. Lesions inside nose have healed.       Objective    /76 (BP Location: Left arm, Patient Position: Sitting, Cuff Size: Adult Regular)   Pulse 75   Temp 98  F (36.7  C) (Oral)   Resp 16   Ht 1.603 m (5' 3.1\")   Wt 74.5 kg (164 lb 3.2 oz)   SpO2 93%   BMI 28.99 kg/m    Body mass index is 28.99 kg/m .  Physical Exam   GEN: alert female in NAD  SKIN: 3 erythematous papules along right mouth/cheek area and one on left. Raised, rough and dry to touch.  Nose: no lesions noted. Mild erythema  HEENT: Eyes: conjunctivae pale. Eyes clear, EARS: TMs gray with LR. Nose: scant white yellow nasal discharge. Negative tenderness to palpation over sinuses  OP: moist, pink no lesions or tonsillar enlargement.   NECK Supple. Lymph negative.   RESP: Lungs CTA with air entry throughout. SpO2 93% Respirations unlabored. Speech is normal.   CV: HRRR, S1, S2, no MRG. Peripheral edema: +1-2 to just below ankle, left ankle worse than right, somewhat taut. Mild erythema feet but no skin changes LE. Negative calf tenderness, redness or warmth. Sensation in tact and sensitive to touch.        Signed Electronically by: Elsa Nunez, " APRN CNP

## 2024-02-07 DIAGNOSIS — D50.9 IRON DEFICIENCY ANEMIA, UNSPECIFIED IRON DEFICIENCY ANEMIA TYPE: Primary | ICD-10-CM

## 2024-02-07 LAB
ALBUMIN SERPL BCG-MCNC: 4 G/DL (ref 3.5–5.2)
ALP SERPL-CCNC: 57 U/L (ref 40–150)
ALT SERPL W P-5'-P-CCNC: 15 U/L (ref 0–50)
ANION GAP SERPL CALCULATED.3IONS-SCNC: 12 MMOL/L (ref 7–15)
AST SERPL W P-5'-P-CCNC: 21 U/L (ref 0–45)
BILIRUB SERPL-MCNC: 0.2 MG/DL
BUN SERPL-MCNC: 12.3 MG/DL (ref 6–20)
CALCIUM SERPL-MCNC: 9.1 MG/DL (ref 8.6–10)
CHLORIDE SERPL-SCNC: 97 MMOL/L (ref 98–107)
CREAT SERPL-MCNC: 0.52 MG/DL (ref 0.51–0.95)
DEPRECATED HCO3 PLAS-SCNC: 26 MMOL/L (ref 22–29)
EGFRCR SERPLBLD CKD-EPI 2021: >90 ML/MIN/1.73M2
FERRITIN SERPL-MCNC: 13 NG/ML (ref 11–328)
GLUCOSE SERPL-MCNC: 84 MG/DL (ref 70–99)
IRON BINDING CAPACITY (ROCHE): 387 UG/DL (ref 240–430)
IRON SATN MFR SERPL: 4 % (ref 15–46)
IRON SERPL-MCNC: 15 UG/DL (ref 37–145)
MAGNESIUM SERPL-MCNC: 1.8 MG/DL (ref 1.7–2.3)
NT-PROBNP SERPL-MCNC: 86 PG/ML (ref 0–900)
POTASSIUM SERPL-SCNC: 4 MMOL/L (ref 3.4–5.3)
PROT SERPL-MCNC: 6.7 G/DL (ref 6.4–8.3)
SODIUM SERPL-SCNC: 135 MMOL/L (ref 135–145)
TRANSFERRIN SERPL-MCNC: 311 MG/DL (ref 200–360)
VIT D+METAB SERPL-MCNC: 27 NG/ML (ref 20–50)

## 2024-02-07 NOTE — TELEPHONE ENCOUNTER
Prior Authorization Not Needed per Insurance    Medication: BACITRACIN 500 UNIT/GM OP OINT  Insurance Company: [employer] - Phone [phone] Fax [phone]  Expected CoPay: $    Pharmacy Filling the Rx: [facility] [place] [id number] - [place], [place] - [address]  Pharmacy Notified: y  Patient Notified: y - pharmacy to notify    Pharmacy got paid claim.

## 2024-02-08 ENCOUNTER — PATIENT OUTREACH (OUTPATIENT)
Dept: ONCOLOGY | Facility: CLINIC | Age: 54
End: 2024-02-08
Payer: COMMERCIAL

## 2024-02-09 DIAGNOSIS — E55.9 VITAMIN D DEFICIENCY: Primary | ICD-10-CM

## 2024-02-09 RX ORDER — ERGOCALCIFEROL 1.25 MG/1
50000 CAPSULE, LIQUID FILLED ORAL
Qty: 8 CAPSULE | Refills: 0 | Status: SHIPPED | OUTPATIENT
Start: 2024-02-09 | End: 2024-03-30

## 2024-02-14 ENCOUNTER — ANCILLARY PROCEDURE (OUTPATIENT)
Dept: GENERAL RADIOLOGY | Facility: CLINIC | Age: 54
End: 2024-02-14
Attending: NURSE PRACTITIONER
Payer: COMMERCIAL

## 2024-02-14 DIAGNOSIS — R05.9 COUGH, UNSPECIFIED TYPE: ICD-10-CM

## 2024-02-14 DIAGNOSIS — J18.9 PNEUMONIA OF LEFT LOWER LOBE DUE TO INFECTIOUS ORGANISM: Primary | ICD-10-CM

## 2024-02-14 DIAGNOSIS — R60.9 EDEMA, UNSPECIFIED TYPE: ICD-10-CM

## 2024-02-14 PROCEDURE — 71046 X-RAY EXAM CHEST 2 VIEWS: CPT | Performed by: RADIOLOGY

## 2024-02-14 RX ORDER — DOXYCYCLINE HYCLATE 100 MG
100 TABLET ORAL 2 TIMES DAILY
Qty: 20 TABLET | Refills: 0 | Status: SHIPPED | OUTPATIENT
Start: 2024-02-14 | End: 2024-02-24

## 2024-02-18 NOTE — TELEPHONE ENCOUNTER
RECORDS STATUS - ALL OTHER DIAGNOSIS      RECORDS RECEIVED FROM: Epic   DATE RECEIVED:    NOTES STATUS DETAILS   OFFICE NOTE from referring provider Epic 02/06/24: AGATHA Barrera CNP   INFUSIONS CE-HP 01/04/19-01/17/19   MEDICATION LIST [facility]    LABS     PATHOLOGY REPORTS Report in EPIC Most recent 01/16/24   ANYTHING RELATED TO DIAGNOSIS Epic Most recent 02/06/24

## 2024-03-04 ENCOUNTER — OFFICE VISIT (OUTPATIENT)
Dept: FAMILY MEDICINE | Facility: CLINIC | Age: 54
End: 2024-03-04
Payer: COMMERCIAL

## 2024-03-04 VITALS
HEART RATE: 77 BPM | SYSTOLIC BLOOD PRESSURE: 126 MMHG | DIASTOLIC BLOOD PRESSURE: 78 MMHG | OXYGEN SATURATION: 97 % | HEIGHT: 64 IN | BODY MASS INDEX: 28.09 KG/M2 | WEIGHT: 164.5 LBS | TEMPERATURE: 98.6 F

## 2024-03-04 DIAGNOSIS — Z23 ENCOUNTER FOR IMMUNIZATION: ICD-10-CM

## 2024-03-04 DIAGNOSIS — J18.9 PNEUMONIA OF LEFT LOWER LOBE DUE TO INFECTIOUS ORGANISM: Primary | ICD-10-CM

## 2024-03-04 RX ORDER — PHENOL 1.4 %
1-2 AEROSOL, SPRAY (ML) MUCOUS MEMBRANE
COMMUNITY

## 2024-03-04 ASSESSMENT — ENCOUNTER SYMPTOMS
SHORTNESS OF BREATH: 0
FEVER: 0
COUGH: 1
CHILLS: 0
FATIGUE: 0

## 2024-03-04 ASSESSMENT — SOCIAL DETERMINANTS OF HEALTH (SDOH)
WITHIN THE LAST YEAR, HAVE YOU BEEN KICKED, HIT, SLAPPED, OR OTHERWISE PHYSICALLY HURT BY YOUR PARTNER OR EX-PARTNER?: NO
WITHIN THE LAST YEAR, HAVE YOU BEEN AFRAID OF YOUR PARTNER OR EX-PARTNER?: NO
WITHIN THE LAST YEAR, HAVE YOU BEEN HUMILIATED OR EMOTIONALLY ABUSED IN OTHER WAYS BY YOUR PARTNER OR EX-PARTNER?: NO
WITHIN THE LAST YEAR, HAVE TO BEEN RAPED OR FORCED TO HAVE ANY KIND OF SEXUAL ACTIVITY BY YOUR PARTNER OR EX-PARTNER?: NO

## 2024-03-04 NOTE — PROGRESS NOTES
[place] [facility] FOLLOW UP VISIT    Patient: Kylie Dietz YOB: 1970    Date of Exam: 3/04/24    Arrival Time: 01 48 PM  Departure Time: 03 02 PM    Charge Phone (written on paperwork): [phone]    Please be advised that this client resides in a facility in which narcotic medications are not permitted. If pain management is needed, please prescribe an alternative medication.     Kylie Dietz is a 54 year old who presents for the following: hospital follow-up.    HPI:    54-year-old female with past medical history (per chart review) significant for alcohol use disorder, tobacco use disorder, COPD/asthma, iron deficiency anemia, hypertension presents for hospital follow-up. Patient initially presented on 2/6/2024 with head/nasal congestion and occasional dyspnea on exertion. She had a chest x-ray on 2/14/2024 which demonstrated left lower infectious process. She was started on doxycycline. Her symptoms persisted and she presented to emergency department on 2/24/2024 at [facility]. She had a repeat chest x-ray which demonstrated the same left lower lobe pneumonia and was given ceftriaxone and azithromycin. She was discharged on cefdinir on 2/27/24.     Today, patient reports she has a lingering cough, otherwise she feels much better.  She reports that she felt nauseous this morning without vomiting.  She attributes this to her mental health provider increasing her dose of naltrexone to 100 mg/day in preparation for her graduation from [employer] at the end of the month.  Her nausea has subsided by the time of this appointment. No other acute concerns/symptoms at time of exam.            Do you need any refills on your Medications today? No    Review Of Systems  Review of Systems   Constitutional:  Negative for chills, fatigue and fever.   Respiratory:  Positive for cough. Negative for shortness of breath.    Cardiovascular:  Negative for chest pain.   Constitutional,  "HEENT, cardiovascular, pulmonary, gi and gu systems are negative, except as otherwise noted.      General Physical Exam:  Vitals: /78   Pulse 77   Temp 98.6  F (37  C) (Oral)   Ht 1.618 m (5' 3.7\")   Wt 74.6 kg (164 lb 8 oz)   SpO2 97%   BMI 28.50 kg/m        Physical Exam  Constitutional:       General: She is not in acute distress.     Appearance: She is not ill-appearing.   Cardiovascular:      Rate and Rhythm: Normal rate and regular rhythm.      Heart sounds: Murmur heard.      Systolic murmur is present with a grade of 2/6.      Comments: LUSB  Pulmonary:      Effort: Pulmonary effort is normal. No respiratory distress.      Breath sounds: Normal breath sounds. No wheezing or rales.   Abdominal:      General: Bowel sounds are normal.      Palpations: Abdomen is soft.      Tenderness: There is no abdominal tenderness.   Musculoskeletal:      Cervical back: Neck supple.      Right lower le+ Edema present.      Left lower leg: No edema.   Lymphadenopathy:      Cervical: No cervical adenopathy.   Neurological:      General: No focal deficit present.      Mental Status: She is alert.   Psychiatric:         Thought Content: Thought content normal.         Judgment: Judgment normal.       ECHOCARDIOGRAM       MEREDITH GAMA   MRN:    [id number]         Accession#:   [id number]   :    1970 54 years Study Date:   2024 10:13:01 AM   Gender: F                  BP:           119/66 mmHg   Height: 163.00 cm          BSA:          1.82 mÂ    Weight: 76.00 kg           Tech:         ARM                              Referring MD: DICKSON AGEE   Site:             [facility]   Reading Location: [facility]   Patient Location: Inpatient.       Procedure: 2D, Color Doppler and Spectral Doppler.     Indication for study: CHF   Cardiac Rhythm: Normal sinus.Study quality:     Final Impressions:    1. Normal left ventricular size, mildly increased wall thickness, normal global " systolic function, calculated EF of 68 %.    2. Right ventricular cavity size is normal, global systolic RV function is normal.    3. No significant valve disease detected.     Comparison   There are no prior studies on this patient for comparison purposes.     Chamber Sizes and Function   Normal left ventricular size, mildly increased wall thickness, normal global systolic function, calculated EF of 68 %. Left atrial size is normal. Right ventricular cavity size is normal, global systolic RV function is normal. The right atrium is normal. The pulmonary artery is of normal size and origin. The sinus of Valsalva is normal sized. The ascending aorta is normal sized.     Valves, RV Pressures and Diastolic Function     The aortic valve is normal in structure and trileaflet, no stenosis and no regurgitation. The mitral valve is normal in structure, no mitral regurgitation. Normal diastolic function. The tricuspid valve is normal in structure. Tricuspid regurgitation is trace regurgitation. The pulmonic valve is normal. Trace pulmonary regurgitation.     Masses, Effusion, Shunts   There is no pericardial effusion. The inferior vena cava is normal sized, respiratory size variation less than 50%. No left to right shunting was detected by limited color flow Doppler interrogation of the interatrial septum.     MEASUREMENTS AND CALCULATIONS     2-D Measurements and LV Function:     LVID (d) 4.1 cm Planimetered EF 68 %   LVID (s) 2.7 cm LV FS% (2D)     34 %   IVS (d)  1.2 cm LVOT diameter   1.9 cm   LVPW (d) 1.2 cm HR              83 bpm   Ao Sinus 3.2 cm LA Vol index    32 ml/m2   Asc Ao   3.3 cm     Diastology:   Mitral         Tissue Doppler   E Peak 1.0 m/s  e', Septum     0.09 m/s   A Peak 0.9 m/s  e', Lateral    0.23 m/s   E/A    1.1      E/e' Average   6.24   DT     169 msec     Aortic Valve:     Vmax       2.2 m/s  DUSTIN (V)   2.52 cmÂ    VTI        0.42 m   DUSTIN (I)   2.51 cmÂ    LVOT V max 1.9 m/s  Max PG    18 mmHg  "  LVOT VTI   0.37 m   Mean PG   10 mmHg   SV         105 ml   Dim Index 0.88   SV index   58 ml/mÂ  CO        8.7 l/min                       CI        4.8 l/min/mÂ      Mitral Valve:     MVA      4.5 cmÂ    MV P 1/2 49 msec     Tricuspid Valve and estimated PA pressures:   TAPSE 2.7 cm      Pulmonic Valve:   PV Vmax  1.5 m/s   PV meanG 6 mmHg   PV VTI   0.35 m   PV AT    116 msec       Electronically signed by Miguel Davies MD on 2/25/2024 11:21:41 AM.       This study was interpreted by an [facility] accredited facility.       Additional Comments:N/A    Assessment / Plan:  1. Pneumonia of left lower lobe due to infectious organism    This is a pleasant 50-year-old female with past medical history significant for alcohol use disorder, tobacco use disorder, COPD/asthma, and hypertension who presents for hospital follow-up after being admitted for left lower lobe pneumonia on 2/24/2024.  Besides a residual cough, patient reports she feels much better.    Patient is currently afebrile and in no acute distress.  Her lung sounds are clear.  Systolic murmur, grade 2 out of 6 was noted at left upper sternal border.  Reviewed patient's echo from 2/25/2024 which demonstrated \"no significant valve disease detected.\"  Advised patient to monitor symptoms including worsening shortness of breath, chest pain, and/or lower extremity swelling.  Patient had +1 edema to her right lower extremity, she reports that she typically wears compression stockings for this which she forgot today.  Encouraged patient to restart compression stockings. Advised patient to discuss naltrexone dosage with her mental health provider.    2. Encounter for immunization  - INFLUENZA VACCINE >6 MONTHS (AFLURIA/FLUZONE)          Referrals Made:   NO REFERRALS MADE TODAY  If a referral was made to a [facility] and you don't get a call from central scheduling please call [phone].  If a Mammogram was ordered for you at [facility] " call [phone] to schedule or change your appointment.  If you had an XRay/CT/Ultrasound/MRI ordered the number is [phone] to schedule or change your radiology appointment.     Follow up as needed    Medication changes made at today's visit: MEDICATIONS:        - Continue other medications without change    All questions/concerns addressed. Patient stated understanding/agreement to plan of care.    AGATHA Brown, CNP  [facility] [employer]    Note: Chart documentation was done in part with Dragon Voice Recognition software.  Although reviewed after completion, some word and grammatical errors may remain. Please contact author for any clarification or concerns.
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
## 2024-04-11 ENCOUNTER — PRE VISIT (OUTPATIENT)
Dept: ONCOLOGY | Facility: CLINIC | Age: 54
End: 2024-04-11
Payer: COMMERCIAL

## 2024-04-18 ENCOUNTER — TELEPHONE (OUTPATIENT)
Dept: FAMILY MEDICINE | Facility: CLINIC | Age: 54
End: 2024-04-18

## 2024-04-18 NOTE — TELEPHONE ENCOUNTER
----- Message from Elsa NbaAGATHA Stafford CNP sent at 4/17/2024  9:26 PM CDT -----  Regarding: follow up Kylie maldonado like I referred her to echo but she did not go,. Can you follow up with [facility] to see if she is still there and ask her to follow up? Thanks

## 2024-11-19 ENCOUNTER — HOSPITAL ENCOUNTER (EMERGENCY)
Facility: CLINIC | Age: 54
Discharge: HOME | End: 2024-11-19
Attending: EMERGENCY MEDICINE | Admitting: EMERGENCY MEDICINE
Payer: COMMERCIAL

## 2024-11-19 VITALS
HEIGHT: 64 IN | DIASTOLIC BLOOD PRESSURE: 76 MMHG | SYSTOLIC BLOOD PRESSURE: 121 MMHG | RESPIRATION RATE: 18 BRPM | HEART RATE: 70 BPM | WEIGHT: 170.6 LBS | TEMPERATURE: 98.2 F | BODY MASS INDEX: 29.12 KG/M2 | OXYGEN SATURATION: 96 %

## 2024-11-19 DIAGNOSIS — F41.1 GAD (GENERALIZED ANXIETY DISORDER): ICD-10-CM

## 2024-11-19 DIAGNOSIS — F10.21 ALCOHOL USE DISORDER, SEVERE, IN EARLY REMISSION (H): ICD-10-CM

## 2024-11-19 DIAGNOSIS — F33.2 MDD (MAJOR DEPRESSIVE DISORDER), RECURRENT SEVERE, WITHOUT PSYCHOSIS (H): ICD-10-CM

## 2024-11-19 PROBLEM — F41.9 ANXIETY: Status: ACTIVE | Noted: 2024-11-19

## 2024-11-19 PROBLEM — F32.9 MAJOR DEPRESSION: Status: ACTIVE | Noted: 2024-11-19

## 2024-11-19 PROCEDURE — 99284 EMERGENCY DEPT VISIT MOD MDM: CPT | Performed by: NURSE PRACTITIONER

## 2024-11-19 PROCEDURE — 99283 EMERGENCY DEPT VISIT LOW MDM: CPT

## 2024-11-19 RX ORDER — BUPROPION HYDROCHLORIDE 150 MG/1
150 TABLET ORAL EVERY MORNING
COMMUNITY
Start: 2024-11-19

## 2024-11-19 RX ORDER — FLUOXETINE 40 MG/1
40 CAPSULE ORAL DAILY
Qty: 30 CAPSULE | Refills: 0 | Status: SHIPPED | OUTPATIENT
Start: 2024-11-19

## 2024-11-19 ASSESSMENT — ACTIVITIES OF DAILY LIVING (ADL)
ADLS_ACUITY_SCORE: 0

## 2024-11-19 NOTE — ED TRIAGE NOTES
Pt c/o wrorsening depression for last 2 months, hx depression, no HI, pt states suicidal ideation in last month w/o specific plan, pt taking meds for depression as prescribed, ABCD intact.       Triage Assessment (Adult)       Row Name 11/19/24 1541          Triage Assessment    Airway WDL WDL        Respiratory WDL    Respiratory WDL WDL        Skin Circulation/Temperature WDL    Skin Circulation/Temperature WDL WDL        Cardiac WDL    Cardiac WDL WDL        Peripheral/Neurovascular WDL    Peripheral Neurovascular WDL WDL        Cognitive/Neuro/Behavioral WDL    Cognitive/Neuro/Behavioral WDL WDL

## 2024-11-19 NOTE — ED NOTES
[facility]  ED to EMPATH Checklist:      Goal for EMPATH: Depression management    Current Behavior: Calm and Cooperative    Safety Concerns: Suicidal, no plan    Legal Hold Status: Voluntary    Medically Cleared by ED provider: Yes    Patient Therapeutically Searched: Not searched - Currently in triage    Belongings: Remain with patient    Independent Ambulation at Baseline: Yes/No: Yes    Participates in Care/Conversation: Yes/No: Yes    Patient Informed about EMPATH: Yes/No: Yes    DEC: Ordered and pending    Patient Ready to be Transferred to EMPATH? Yes/No: Yes

## 2024-11-19 NOTE — Clinical Note
Kylie Dietz was seen and treated in our emergency department on 11/19/2024.    She has been unable to attend work since 11/15/24 due to her condition. Please excuse her from work during this time to allow for recovery.      Sincerely,     [facility] Emergency Dept

## 2024-11-19 NOTE — ED PROVIDER NOTES
"  Emergency Department Note      History of Present Illness     Chief Complaint   Depression      HPI   Kylie Dietz is a 54 year old female with history of hypertension, BOLA, major depressive disorder, suicidal ideation, suicide attempt, alcohol abuse, and more, who presents to the ED for evaluation of depression.  She reports a several month history of worsening depression.  She reports history of suicide attempt in the past.  Denies recent self-harm.  She states she has been started on new medications without relief.  She denies chest pain, shortness of breath, headache, vomiting, diarrhea.  Denies fever/chills/body aches.  She is here with her daughter today.  She states she needs a mental health assessment today.     Independent Historian   None    Review of External Notes   Reviewed PCP note from 10/15/2024.    Past Medical History     Medical History and Problem List   Hypertension   Major depressive disorder   Raynaud's syndrome  Peripheral edema  Subacute cough   Alcohol abuse, in remission  Asthma  Atherosclerosis  Pneumonia  BOLA  GERD  Hiatal hernia  Anemia  Liver cyst  Lung nodule  Mild THC abuse   Neuralgia   Obstructive sleep apnea  Positive JOAQUIN  Suicidal ideation  Thromboangiitis obliterans (Buerger's disease)  Tobacco use disorder  Migraine     Medications   Albuterol  Amlodipine  Aripiprazole  Hydrochlorothiazide   Hydroxyzine pamoate   Omeprazole  Trazadone   Celecoxib   Alprazolam   Bupropion   Fluoxetine   Rosuvastatin  Budesonide-formoterol     Surgical History   Thumb trigger finger release     Physical Exam     Patient Vitals for the past 24 hrs:   BP Temp Temp src Pulse Resp SpO2 Height Weight   11/19/24 1545 110/70 97.7  F (36.5  C) Temporal 81 18 99 % 1.626 m (5' 4\") 76.7 kg (169 lb)     Physical Exam  Nursing note and vitals reviewed.  HENT:   Mouth/Throat: Moist mucous membranes.   Eyes: EOMI, nonicteric sclera  Cardiovascular: Normal rate, regular rhythm, no murmurs, rubs, or " gallops  Pulmonary/Chest: Effort normal and breath sounds normal. No respiratory distress. No wheezes. No rales.   Musculoskeletal: Normal range of motion.   Neurological: Alert. Moves all extremities spontaneously.   Skin: Skin is warm and dry. No rash noted.   Psych: Depressed mood, congruent affect.  Does not appear to respond to internal stimuli.        ED Course      Medications Administered   Medications - No data to display    Procedures   Procedures     Discussion of Management   None    ED Course   ED Course as of 11/19/24 1621   Tue Nov 19, 2024   1621 I obtained history and examined the patient as noted above.        Additional Documentation  None    Medical Decision Making / Diagnosis     CMS Diagnoses: None    MIPS       None    MDM   Kylie Dietz is a 54 year old female who presents for mental health evaluation for a 2-month history of worsening depression with associated anxiety.  Her vital signs here are normal.  She denies any medical concerns today, stating she is here purely for her mental health.  I am not concerned for overdose at this time.  She is medically cleared for evaluation in our empath unit.  She and her daughter are in agreement with this plan.  All questions answered.    Disposition   The patient was transferred to EmPATH.     Diagnosis     ICD-10-CM    1. Depression, unspecified depression type  F32.A              Scribe Disclosure:  I, Alayna Carlin, am serving as a scribe at 4:19 PM on 11/19/2024 to document services personally performed by Luis A Spear MD based on my observations and the provider's statements to me.        Luis A Spear MD  11/19/24 0011

## 2024-11-19 NOTE — Clinical Note
Kylie Dietz was seen and treated in our emergency department on 11/19/2024.    She has been unable to attend work since 11/15/24 due to her condition. Please excuse her from work during this time to allow for recovery.      Sincerely,     [facility] Emergency Dept

## 2024-11-20 ENCOUNTER — TELEPHONE (OUTPATIENT)
Dept: BEHAVIORAL HEALTH | Facility: CLINIC | Age: 54
End: 2024-11-20
Payer: COMMERCIAL

## 2024-11-20 NOTE — ED PROVIDER NOTES
[facility] Unit - Psychiatric Consultation  [facility] Emergency Department    Kylie Dietz MRN: [id number]   Age: 54 year old YOB: 1970     History     Chief Complaint   Patient presents with    Depression     HPI  Kylie Dietz is a 54 year old female with history notable for major depressive disorder, generalized anxiety disorder, alcohol use disorder, and hypertension.  Patient presented to the emergency department for evaluation of worsening depressive symptoms leading to difficulty functioning.  Patient was medically evaluated in the emergency department and determined to be medically stable for transfer to [facility] for further psychiatric assessment.  Patient is nearing 5 hours in emergency care.    Here at [facility], patient describes worsening depressive symptoms over the last 2 months.  She recalls that her depressive symptoms initially began to worsen 1 to 2 years ago following a break-up with her ex-boyfriend.  Reports they were together for 12 years, and this was a significant life change.  She then entered substance use disorders treatment for her alcohol use in January, and completed the program in March.  She endorses sobriety from alcohol since January.  Reports an increasingly difficult time getting off the couch or up out of bed recently.  She is spending much of her time sleeping, during the night as well as during the day.  Endorses anhedonia, amotivation, poor concentration, low energy. She reports that she has been avoidant of work due to significant anxiety and worry which has been difficult to control.  She finds it difficult to focus and perform her job.  She reports that she is still thinking about her ex-boyfriend, and is sometimes distracted by thoughts about him while at work.  She has missed the last several days of work.  She reports her appetite is stable, believing that one of her medications leads to an increased appetite.  She has found it difficult to maintain  sobriety from alcohol, but endorses sobriety since January.  She and her daughter recently began to pursue admission to an IRTS facility given her inability to function.  Patient has difficulty recalling psychotropic medications and past medication trials, but is able to acknowledge that she is currently prescribed fluoxetine for about the last 2 months.  She reports that about 2 days ago, she increased the dose from 1 capsule to 2 capsules, equating to 40 mg.  She is also currently maintained on bupropion.  It is unclear if she is still taking Abilify.  We discussed a plan to maintain 40 mg of fluoxetine, with future consideration for increasing the bupropion dose.  She denies any suicidal or homicidal thinking.  There is no evidence for psychosis or nhan.  Patient is seeking discharge home this evening.    Past Medical History  Past Medical History:   Diagnosis Date    Anxiety     Benign essential hypertension     Major depression     Raynaud's syndrome      No past surgical history on file.  FLUoxetine (PROZAC) 40 MG capsule  albuterol (PROAIR HFA/PROVENTIL HFA/VENTOLIN HFA) 108 (90 Base) MCG/ACT inhaler  amLODIPine (NORVASC) 5 MG tablet  ARIPiprazole (ABILIFY) 5 MG tablet  aspirin (ASA) 81 MG chewable tablet  budesonide-formoterol (SYMBICORT) 80-4.5 MCG/ACT Inhaler  buPROPion (WELLBUTRIN XL) 150 MG 24 hr tablet  ferrous sulfate (FEROSUL) 325 (65 Fe) MG tablet  hydrochlorothiazide (HYDRODIURIL) 25 MG tablet  hydrocortisone (CORTAID) 1 % external cream  hydrOXYzine eber (VISTARIL) 25 MG capsule  ibuprofen (ADVIL/MOTRIN) 600 MG tablet  Melatonin 10 MG TABS tablet  Multiple Vitamins-Minerals (MULTIVITAMIN ADULTS) TABS  naltrexone (DEPADE/REVIA) 50 MG tablet  nicotine (NICORETTE) 4 MG lozenge  omeprazole (PRILOSEC) 20 MG DR capsule  polyethylene glycol (MIRALAX) 17 GM/Dose powder  traZODone (DESYREL) 100 MG tablet  vitamin D3 (CHOLECALCIFEROL) 50 mcg (2000 units) tablet      Allergies   Allergen Reactions    Animal  "Dander Rash     \"Pets\"     Family History  Family History   Problem Relation Age of Onset    Alzheimer Disease Mother     Heart Disease Father         bypass x3    Myocardial Infarction Father     Alcoholism Father     Edema Sister     Impaired Fasting Glucose Sister     Substance Abuse Brother     Substance Abuse Brother     Alcoholism Maternal Grandmother     Suicide Paternal Grandfather      Social History   Social History     Tobacco Use    Smoking status: Former     Current packs/day: 2.00     Average packs/day: 2.0 packs/day for 35.0 years (70.0 ttl pk-yrs)     Types: Cigarettes    Smokeless tobacco: Former    Tobacco comments:     Quit 2 months ago  11/2023   Vaping Use    Vaping status: Never Used   Substance Use Topics    Alcohol use: Not Currently     Comment: 10-12 a day    Drug use: Never          Review of Systems  A medically appropriate review of systems was performed with pertinent positives and negatives noted in the HPI, and all other systems negative.    Physical Examination   BP: 110/70  Pulse: 81  Temp: 97.7  F (36.5  C)  Resp: 18  Height: 162.6 cm (5' 4\")  Weight: 76.7 kg (169 lb)  SpO2: 99 %    Physical Exam  General: Appears stated age.   Neuro: Alert and fully oriented. Extremities appear to demonstrate normal strength on visual inspection.   Integumentary/Skin: no rash visualized, normal color    Psychiatric Examination   Appearance: awake, alert, adequately groomed, appeared as age stated, and casually dressed  Attitude:  cooperative  Eye Contact:  fair  Mood:  depressed  Affect:  mood congruent and intensity is blunted  Speech:  clear, coherent and normal prosody  Psychomotor Behavior:  no evidence of tardive dyskinesia, dystonia, or tics  Thought Process:  linear  Associations:  no loose associations  Thought Content:  no evidence of suicidal ideation or homicidal ideation and no evidence of psychotic thought  Insight:  fair  Judgement:  fair  Oriented to:  time, person, and " place  Attention Span and Concentration:  fair  Recent and Remote Memory:  fair  Language: able to name/identify objects without impairment  Fund of Knowledge: intact with awareness of current and past events    ED Course     ED Course as of 11/19/24 2035 Tue Nov 19, 2024   1621 I obtained history and examined the patient as noted above.        Labs Ordered and Resulted from Time of ED Arrival to Time of ED Departure - No data to display    Assessments & Plan (with Medical Decision Making)   Patient presenting with worsening depressive symptoms leading to difficulty functioning.  Patient has missed several days of work recently.  Patient and her daughter are pursuing an IRTS facility for increased mental health support. Nursing notes reviewed noting no acute issues.     I have reviewed the assessment completed by the [facility].     Preliminary diagnosis:    ICD-10-CM    1. MDD (major depressive disorder), recurrent severe, without psychosis (H)  F33.2       2. BOLA (generalized anxiety disorder)  F41.1       3. Alcohol use disorder, severe, in early remission (H)  F10.21            Treatment Plan:  -Increase fluoxetine from 20 mg to 40 mg daily to further target symptoms of depression and anxiety.  Patient reports she self-increased the dose 2 days ago.  -Continue Wellbutrin  mg daily for depression augmentation.  Consider further increase to 300 mg in the future should depressive symptoms persist despite an adequate trial of higher doses of fluoxetine.  -Continue Abilify 5 mg daily for depression augmentation  -Continue trazodone nightly as needed for sleep  -Patient will be provided with outpatient psychotherapeutic follow-up as well as outpatient psychiatric medication management.  -Continue to abstain from alcohol and other nonprescribed mind or mood altering substances.  -Records from this ED visit will be faxed to [facility] facility, where patient is pursing admission.  -Patient will be discharged  home with safety plan in place    Plan of care discussed with RN and LMHP.       After a period of working with the treatment team on the [facility] unit, the patient's mental state improved to allow a safe transition to outpatient care. After counseling on the diagnosis, work-up, and treatment plan, the patient was discharged. Close follow-up with a psychiatrist and/or therapist was recommended and community psychiatric resources were provided. Patient is to return to the ED if any urgent or potentially life-threatening concerns.     At the time of discharge, the patient's acute suicide risk was determined to be low due to the following factors: Reduction in the intensity of mood/anxiety symptoms that preceded the admission, denial of suicidal thoughts, denies feeling helpless or hopeless, not currently under the influence of alcohol or illicit substances, denies experiencing command hallucinations, no immediate access to firearms. The patient's acute risk could be higher if noncompliant with their treatment plan, medications, follow-up appointments or using illicit substances or alcohol. Protective factors include: social supports, children, stable housing, employment      --  Luis A Albrecht CNP   [facility] EMERGENCY DEPT  [facility] Unit      Luis A Albrecht CNP  11/19/24 2046

## 2024-11-20 NOTE — TELEPHONE ENCOUNTER
Unable to reach patient. Wrong number on file. No other phone number for patient was available in [facility] at time of call. No further follow-up is needed.

## 2024-11-20 NOTE — DISCHARGE INSTRUCTIONS
"Upcoming Appointments  Date: Thursday, 11/21/2024  Time: 2:00 pm - 3:00 pm  Provider: Lana Wooten MA  Supervised by: Adenike Anglin MA  LMFT  Location: [facility], [address], [place] [address]  Phone: [phone]  Type: Teletherapy      Date: Monday, 11/25/2024  Time: 9:00 am - 10:00 am  Provider: Rosemarie Culver  MSN  CNP,PMHNP,RN  Location: [facility], [address] [place] [address]  Phone: [phone]  Type: Telepsychiatry      Aftercare Plan  If I am feeling unsafe or I am in a crisis, I will:   Contact my established care providers   Call the National Suicide Prevention Lifeline: 988  Go to the nearest emergency room   Call 911       Crisis Lines  Crisis Text Line  Text 583040  You will be connected with a trained live crisis counselor to provide support.    Por espanol, texto  FERMÍN a 156627 o texto a 442-AYUDAME en WhatsApp    [employer] (LGBTQ Youth Crisis Line)  1.836.627.8141  text START to 681-590      Community Resources  Fast Tracker  Linking people to mental health and substance use disorder resources  fasttraCertus Groupn.org     [place] Mental Health Warm Line  Peer to peer support  Monday thru Saturday, 12 pm to 10 pm  961.913.0421 or 2.325.529.3004  Text \"Support\" to 01976    National [place] on Mental Illness (JOSE)  610.016.2875 or 1.888.JOSE.HELPS      Mental Health Apps  My3  https://my3app.org/    VirtualHopeBox  https://NCR.org/apps/virtual-hope-box/      Additional Information  Today you were seen by a licensed mental health professional through Triage and Transition services, [facility] ([facility])  for a crisis assessment in the Emergency Department at [facility].  It is recommended that you follow up with your established providers (psychiatrist, mental health therapist, and/or primary care doctor - as relevant) as soon as possible. Coordinators from [facility] will be calling you in the next " 24-48 hours to ensure that you have the resources you need.  You can also contact [facility] coordinators directly at [phone]. You may have been scheduled for or offered an appointment with a mental health provider. [facility] maintains an extensive network of licensed behavioral health providers to connect patients with the services they need.  We do not charge providers a fee to participate in our referral network.  We match patients with providers based on a patient's specific needs, insurance coverage, and location.  Our first effort will be to refer you to a provider within your care system, and will utilize providers outside your care system as needed.        [facility] Support Groups/Resources:  Website: https://[facility].org/support/support-groups-for-adults-living-with-a-mental-illness/  Location(s): [facility], [facility], [facility], [facility], [place], & [facility].   Phone: [phone]  Email: [email or web address]  About: JOSE [facility] is a support group for adults living with mental illness. Groups are free and meet for 90 minutes. All groups are led by trained facilitators who also live with mental illness. Groups provide a welcoming, safe, judgment-free space to share challenges, successes, and learn from one another. There are also groups that focus on LGBTQ+ and BI-POC identities.

## 2024-11-20 NOTE — PROGRESS NOTES
"54 year old female received from ED for psychiatric evaluation. Patient reports she came in for a mental health assessment. States she is trying to get into \"[facility]\" that she explains is a 90 day treatment program for mental health. Patient states the staff at [facility] told her to come to [facility] for a mental health assessment and \"referral\" in order to get into their program. Patient irritable during intake process. Repeating multiple times \"I am not spending the night.\" Patient refused to review medications with writer. Stated she takes medications, but does not know what they are and wouldn't know the names, does not have a way to find out and she is the only person that manages her medications. Patient expressed frustration about having to talk about her medications. States she is only her for the mental health evaluation and is not interested in starting or changing medications. States her only goal is to get into [facility]. RN re-educated patient on the [facility] unit process. Patient states she does not have a psychiatric provider anymore after she was not allowed to continue meeting with her previous psychiatric provider due to missed appointments. Patient states she does not have a therapist either, but is not interested in being set up with appointments at this time as she hopes to go to [facility] for treatment for her depression. Patient Denies SI/HI. Denies experiencing auditory or visual hallucinations. Denies drug or alcohol use.    Nursing and risk assessments completed. Assessments reviewed with LMHP and physician. Admission information reviewed with patient. Patient given a tour of [facility] and instructions on using the facility. Questions regarding [facility] addressed. Pt safety search completed.     "

## 2024-11-20 NOTE — PROGRESS NOTES
Patient agreeable to discharge plan. Discharge instructions reviewed with patient including follow-up care plan. Medications: Prozac prescribed to patient's preferred outpatient pharmacy. Reviewed safety plan and outpatient resources. Denies SI and HI. All belongings that were brought into the hospital have been returned to patient. Escorted off the unit at 2103 accompanied by [facility] staff. Discharged to home with daughter.

## 2024-11-20 NOTE — CONSULTS
Diagnostic Evaluation Consultation  Crisis Assessment    Patient Name: Kylie Dietz  Age:  54 year old  Legal Sex: female  Gender Identity: female  Pronouns: she/her  Race: White  Ethnicity: Not  or   Language: English      Patient was assessed: In person   Crisis Assessment Start Date: 11/19/24  Crisis Assessment Start Time: 1830  Crisis Assessment Stop Time: 1913  Patient location: [facility] EMERGENCY DEPT                             [id number]      Referral Data and Chief Complaint  Kylie Dietz presents to the ED with family/friends. Patient is presenting to the ED for the following concerns: Anxiety, Depression, Worsening psychosocial stress.   Factors that make the mental health crisis life threatening or complex are:  Pt presents to ED with family due to concerns from increased anxiety and depression that are influenced by worsening psychosocial stress. Pt is oriented x4. Pt reports MH sx have been present since February 2024 but have worsened in the last 4-6 weeks. Pt endorses functional impairment from MH sx resulting in missing work and neglecting personal cares (not showering and/or cleaning.) Pt reports psychosocial stress from work failures, financial concerns from a property dispute, and grief/loss from breakup from ex-. Pt denies SI, SIB and HI. Pt denies AVH and other psychotic sx. Pt denies substance use concerns; pt has been sober for 10 months. Pt is followed by OP therapist and OP psychiatrist; however, she has stopped attending sessions and not worked with them for over a month. Pt reports taking psychiatric medications for depression and anxiety but could not remember what she takes..      Informed Consent and Assessment Methods  Explained the crisis assessment process, including applicable information disclosures and limits to confidentiality, assessed understanding of the process, and obtained consent to proceed with the assessment.  Assessment  methods included conducting a formal interview with patient, review of medical records, collaboration with medical staff, and obtaining relevant collateral information from family and community providers when available.  : done     Patient response to interventions: eager to participate  Coping skills were attempted to reduce the crisis:  self-compassion and cognitive restructuring       History of the Crisis   Pt is a 54 year old female that presents for DEC assessment as calm and cooperative. Pt reports prior dx of depression and anxiety. Pt has hx of prior IP MH hospitalizations most recently in August 2024 at [facility] and September 2024 at [facility]. Pt completed residential CD tx through [facility] in December 2023-February 2024; pt has maintained sobriety since with no relapses. Pt was working with OP therapist and OP psychiatrist but stopped going consistently due to lack of motivation. Pt reports hx of suicidal behavior with prior attempt 20 years ago when she [place] her [place]. Pt endorses family hx of depression, anxiety, and completed suicides. Pt denies hx of trauma/abuse.      Brief Psychosocial History  Family:  Single, Children yes  Support System:  Children, Sibling(s), Friend  Employment Status:  employed full-time  Source of Income:  salary/wages  Financial Environmental Concerns:  none  Current Hobbies:  cooking/baking, group/social activities, family functions  Barriers in Personal Life:  emotional concerns      Significant Clinical History  Current Anxiety Symptoms:  anxious, excessive worry, racing thoughts  Current Depression/Trauma:  apathy, avoidance, difficulty concentrating, withdrawl/isolation, negativistic, crying or feels like crying, low self esteem, impaired decision making, irritable, helplessness, hopelessness, sadness, excessive guilt, thoughts of death/suicide  Current Somatic Symptoms:  anxious, racing thoughts, excessive worry  Current Psychosis/Thought Disturbance:    "  Current Eating Symptoms:  loss of appetite  Chemical Use History:  Alcohol: None  Benzodiazepines: None  Opiates: None  Cocaine: None  Marijuana: None  Other Use: None   Past diagnosis:  Anxiety Disorder, Depression, Substance Use Disorder  Family history:  Anxiety Disorder, Depression, Suicide Attempt, Death by Suicide, Substance Use Disorder  Past treatment:  Individual therapy, Psychiatric Medication Management, Day Treatment, Residential Treatment, Inpatient Hospitalization  Details of most recent treatment:  Pt is not currently working with OP providers - worked with OP therapist and OP psychiatrist over a month ago  Other relevant history:          Collateral Information  Is there collateral information: Yes     Collateral information name, relationship, phone number:  Miroslava, daughter, #[phone]    What happened today: Pt presents to ED after being recommended by [facility] to receive assessment to support intake process.     What is different about patient's functioning: Pt's functioning has markedly decreased. \"She does not do anything.\" Miroslava reports that pt sits most of the day, wont shower, wont clean, wont cook, and has been missing shifts at work due to lack of motivation.     Concern about alcohol/drug use:  no reported concerns    What do you think the patient needs:  supportive living environment    Has patient made comments about wanting to kill themselves/others: no    If d/c is recommended, can they take part in safety/aftercare planning:  yes    Additional collateral information:          Risk Assessment  [place] Suicide Severity Rating Scale Full Clinical Version:  Suicidal Ideation  Q1 Wish to be Dead (Lifetime): Yes  Q2 Non-Specific Active Suicidal Thoughts (Lifetime): Yes  3. Active Suicidal Ideation with any Methods (Not Plan) Without Intent to Act (Lifetime): Yes  Q4 Active Suicidal Ideation with Some Intent to Act, Without Specific Plan (Lifetime): No  Q5 Active " Suicidal Ideation with Specific Plan and Intent (Lifetime): Yes  Q6 Suicide Behavior (Lifetime): yes     Suicidal Behavior (Lifetime)  Actual Attempt (Lifetime): Yes  Total Number of Actual Attempts (Lifetime): 1  Actual Attempt Description (Lifetime): drove [place] a lake 20 years ago  Has subject engaged in non-suicidal self-injurious behavior? (Lifetime): No  Interrupted Attempts (Lifetime): No  Aborted or Self-Interrupted Attempt (Lifetime): No  Preparatory Acts or Behavior (Lifetime): No    [place] Suicide Severity Rating Scale Recent:   Suicidal Ideation (Recent)  Q1 Wished to be Dead (Past Month): no  Q2 Suicidal Thoughts (Past Month): no  Q3 Suicidal Thought Method: no  Q4 Suicidal Intent without Specific Plan: no  Q5 Suicide Intent with Specific Plan: no  If yes to Q6, within past 3 months?: no  Level of Risk per Screen: no risks indicated     Suicidal Behavior (Recent)  Actual Attempt (Past 3 Months): No  Has subject engaged in non-suicidal self-injurious behavior? (Past 3 Months): No  Interrupted Attempts (Past 3 Months): No  Aborted or Self-Interrupted Attempt (Past 3 Months): No  Preparatory Acts or Behavior (Past 3 Months): No    Environmental or Psychosocial Events: loss of a relationship due to divorce/separation, work or task failure, helplessness/hopelessness, other life stressors  Protective Factors: Protective Factors: strong bond to family unit, community support, or employment, responsibilities and duties to others, including pets and children, lives in a responsibly safe and stable environment, help seeking    Does the patient have thoughts of harming others? Feels Like Hurting Others: no  Previous Attempt to Hurt Others: no  Is the patient engaging in sexually inappropriate behavior?: no    Is the patient engaging in sexually inappropriate behavior?  no          Mental Status Exam   Affect: Blunted  Appearance: Appropriate  Attention Span/Concentration: Attentive  Eye Contact: Engaged    Fund  of Knowledge: Appropriate   Language /Speech Content: Fluent  Language /Speech Volume: Normal  Language /Speech Rate/Productions: Normal  Recent Memory: Intact  Remote Memory: Intact  Mood: Sad, Depressed  Orientation to Person: Yes   Orientation to Place: Yes  Orientation to Time of Day: Yes  Orientation to Date: Yes     Situation (Do they understand why they are here?): Yes  Psychomotor Behavior: Normal  Thought Content: Clear  Thought Form: Obsessive/Perseverative          Medication  Psychotropic medications:   Medication Orders - Psychiatric (From admission, onward)      Start     Dose/Rate Route Frequency Ordered Stop    11/19/24 0000  FLUoxetine (PROZAC) 40 MG capsule         40 mg Oral DAILY 11/19/24 2033               Current Care Team  Patient Care Team:  System, Provider Not In as PCP - General (Clinic)  Elsa Nunez APRN CNP as Assigned PCP  Viviana Echeverria MD as MD (Hematology & Oncology)    Diagnosis  Patient Active Problem List   Diagnosis Code    Peripheral edema R60.0    Subacute cough R05.2    Major depression F32.9    Anxiety F41.9       Primary Problem This Admission  Active Hospital Problems    Major depression      Anxiety        Clinical Summary and Substantiation of Recommendations   Pt is recommended for discharge with referrals for OP therapy and OP psychiatry. Pt presents to ED with family due to concerns from increased anxiety and depression that are influenced by worsening psychosocial stress. Pt is oriented x4. Pt reports MH sx have been present since February 2024 but have worsened in the last 4-6 weeks. Pt endorses functional impairment from MH sx resulting in missing work and neglecting personal cares (not showering and/or cleaning.) Pt reports psychosocial stress from work failures, financial concerns from a property dispute, and grief/loss from breakup from ex-. Pt denies SI, SIB and HI. Pt denies AVH and other psychotic sx. Pt denies substance use concerns;  pt has been sober for 10 months. Pt is followed by OP therapist and OP psychiatrist; however, she has stopped attending sessions and not worked with them for over a month. Pt reports taking psychiatric medications for depression and anxiety but could not remember what she takes. Pt lacks current OP supports; referrals completed for OP therapy and OP psychiatry. Pt reports wanting to go to [facility] facility; pt signed release to exchange records with [facility] and was encouraged to follow up with OP providers to cover any other referral specifics. Pt reports feeling more hopeful with increased supports. Pt reports wanting to discharge. Pt engaged in safety planning process by identifying early warning signs, coping skills, and external supports. Pt and provider are agreeable with discharge care path. Referrals and resources included in AVS. No further LM tasks needed at this time.                          Patient coping skills attempted to reduce the crisis:  self-compassion and cognitive restructuring    Disposition  Recommended disposition: Individual Therapy, Medication Management        Reviewed case and recommendations with attending provider. Attending Name: Ministerio Albrecht       Attending concurs with disposition: yes       Patient and/or validated legal guardian concurs with disposition:   yes       Final disposition:  discharge    Legal status on admission: Voluntary/Patient has signed consent for treatment    Assessment Details   Total duration spent with the patient: 43 min     CPT code(s) utilized: 45871 - Psychotherapy for Crisis - 60 (30-74*) min    Max PHILLIP Burgos Psychotherapist  [facility] - Triage & Transition Services  Callback: [phone]

## 2025-01-18 ENCOUNTER — HEALTH MAINTENANCE LETTER (OUTPATIENT)
Age: 55
End: 2025-01-18